# Patient Record
Sex: FEMALE | Race: WHITE
[De-identification: names, ages, dates, MRNs, and addresses within clinical notes are randomized per-mention and may not be internally consistent; named-entity substitution may affect disease eponyms.]

---

## 2017-02-03 ENCOUNTER — HOSPITAL ENCOUNTER (OUTPATIENT)
Dept: HOSPITAL 45 - C.LABPVFM | Age: 67
Discharge: HOME | End: 2017-02-03
Attending: NURSE PRACTITIONER
Payer: COMMERCIAL

## 2017-02-03 DIAGNOSIS — I10: Primary | ICD-10-CM

## 2017-02-03 DIAGNOSIS — E78.1: ICD-10-CM

## 2017-02-03 DIAGNOSIS — E11.9: ICD-10-CM

## 2017-02-03 LAB
ANION GAP SERPL CALC-SCNC: 8 MMOL/L (ref 3–11)
BUN SERPL-MCNC: 11 MG/DL (ref 7–18)
BUN/CREAT SERPL: 13 (ref 10–20)
CALCIUM SERPL-MCNC: 9 MG/DL (ref 8.5–10.1)
CHLORIDE SERPL-SCNC: 100 MMOL/L (ref 98–107)
CHOLEST/HDLC SERPL: 4.2 {RATIO}
CO2 SERPL-SCNC: 35 MMOL/L (ref 21–32)
CREAT SERPL-MCNC: 0.82 MG/DL (ref 0.6–1.2)
EST. AVERAGE GLUCOSE BLD GHB EST-MCNC: 111 MG/DL
GLUCOSE SERPL-MCNC: 103 MG/DL (ref 70–99)
GLUCOSE UR QL: 54 MG/DL
KETONES UR QL STRIP: 102 MG/DL
NITRITE UR QL STRIP: 359 MG/DL (ref 0–150)
PH UR: 228 MG/DL (ref 0–200)
POTASSIUM SERPL-SCNC: 2.9 MMOL/L (ref 3.5–5.1)
SODIUM SERPL-SCNC: 143 MMOL/L (ref 136–145)
VERY LOW DENSITY LIPOPROT CALC: 72 MG/DL

## 2017-07-28 ENCOUNTER — HOSPITAL ENCOUNTER (OUTPATIENT)
Dept: HOSPITAL 45 - C.LABPVFM | Age: 67
Discharge: HOME | End: 2017-07-28
Attending: NURSE PRACTITIONER
Payer: COMMERCIAL

## 2017-07-28 DIAGNOSIS — E11.9: ICD-10-CM

## 2017-07-28 DIAGNOSIS — I10: Primary | ICD-10-CM

## 2017-07-28 DIAGNOSIS — E78.5: ICD-10-CM

## 2017-07-28 DIAGNOSIS — E87.6: ICD-10-CM

## 2017-07-28 DIAGNOSIS — N28.9: ICD-10-CM

## 2017-07-28 DIAGNOSIS — E78.1: ICD-10-CM

## 2017-07-28 LAB
ALBUMIN/GLOB SERPL: 1.1 {RATIO} (ref 0.9–2)
ALP SERPL-CCNC: 86 U/L (ref 45–117)
ALT SERPL-CCNC: 22 U/L (ref 12–78)
ANION GAP SERPL CALC-SCNC: 4 MMOL/L (ref 3–11)
AST SERPL-CCNC: 13 U/L (ref 15–37)
BUN SERPL-MCNC: 12 MG/DL (ref 7–18)
BUN/CREAT SERPL: 16.9 (ref 10–20)
CALCIUM SERPL-MCNC: 9.8 MG/DL (ref 8.5–10.1)
CHLORIDE SERPL-SCNC: 104 MMOL/L (ref 98–107)
CO2 SERPL-SCNC: 33 MMOL/L (ref 21–32)
CREAT SERPL-MCNC: 0.74 MG/DL (ref 0.6–1.2)
EST. AVERAGE GLUCOSE BLD GHB EST-MCNC: 108 MG/DL
GLOBULIN SER-MCNC: 3.6 GM/DL (ref 2.5–4)
GLUCOSE SERPL-MCNC: 97 MG/DL (ref 70–99)
POTASSIUM SERPL-SCNC: 2.9 MMOL/L (ref 3.5–5.1)
SODIUM SERPL-SCNC: 141 MMOL/L (ref 136–145)

## 2017-09-22 ENCOUNTER — HOSPITAL ENCOUNTER (INPATIENT)
Dept: HOSPITAL 45 - C.EDB | Age: 67
LOS: 5 days | Discharge: NURSING, REHAB OR LONG-TERM CARE | DRG: 64 | End: 2017-09-27
Attending: HOSPITALIST | Admitting: HOSPITALIST
Payer: COMMERCIAL

## 2017-09-22 VITALS
WEIGHT: 239.64 LBS | HEIGHT: 64 IN | BODY MASS INDEX: 40.91 KG/M2 | HEIGHT: 64 IN | WEIGHT: 239.64 LBS | BODY MASS INDEX: 40.91 KG/M2

## 2017-09-22 VITALS
TEMPERATURE: 98.06 F | HEART RATE: 74 BPM | OXYGEN SATURATION: 94 % | DIASTOLIC BLOOD PRESSURE: 88 MMHG | SYSTOLIC BLOOD PRESSURE: 173 MMHG

## 2017-09-22 VITALS — OXYGEN SATURATION: 94 %

## 2017-09-22 DIAGNOSIS — Z83.3: ICD-10-CM

## 2017-09-22 DIAGNOSIS — Z79.82: ICD-10-CM

## 2017-09-22 DIAGNOSIS — R73.03: ICD-10-CM

## 2017-09-22 DIAGNOSIS — R68.2: ICD-10-CM

## 2017-09-22 DIAGNOSIS — Z82.49: ICD-10-CM

## 2017-09-22 DIAGNOSIS — I10: ICD-10-CM

## 2017-09-22 DIAGNOSIS — J44.9: ICD-10-CM

## 2017-09-22 DIAGNOSIS — Z86.73: ICD-10-CM

## 2017-09-22 DIAGNOSIS — R53.1: ICD-10-CM

## 2017-09-22 DIAGNOSIS — R32: ICD-10-CM

## 2017-09-22 DIAGNOSIS — E78.5: ICD-10-CM

## 2017-09-22 DIAGNOSIS — E87.6: ICD-10-CM

## 2017-09-22 DIAGNOSIS — R47.81: ICD-10-CM

## 2017-09-22 DIAGNOSIS — I63.9: Primary | ICD-10-CM

## 2017-09-22 DIAGNOSIS — J18.9: ICD-10-CM

## 2017-09-22 DIAGNOSIS — Z80.1: ICD-10-CM

## 2017-09-22 DIAGNOSIS — F17.210: ICD-10-CM

## 2017-09-22 LAB
ALP SERPL-CCNC: 84 U/L (ref 45–117)
ALT SERPL-CCNC: 17 U/L (ref 12–78)
ANION GAP SERPL CALC-SCNC: 6 MMOL/L (ref 3–11)
APPEARANCE UR: CLEAR
AST SERPL-CCNC: 14 U/L (ref 15–37)
BASOPHILS # BLD: 0.04 K/UL (ref 0–0.2)
BASOPHILS NFR BLD: 0.5 %
BILIRUB UR-MCNC: (no result) MG/DL
BUN SERPL-MCNC: 13 MG/DL (ref 7–18)
BUN/CREAT SERPL: 16.7 (ref 10–20)
CALCIUM SERPL-MCNC: 9 MG/DL (ref 8.5–10.1)
CHLORIDE SERPL-SCNC: 106 MMOL/L (ref 98–107)
CO2 SERPL-SCNC: 29 MMOL/L (ref 21–32)
COLOR UR: YELLOW
COMPLETE: YES
CREAT CL PREDICTED SERPL C-G-VRATE: 88.2 ML/MIN
CREAT SERPL-MCNC: 0.76 MG/DL (ref 0.6–1.2)
EOSINOPHIL NFR BLD AUTO: 221 K/UL (ref 130–400)
GLUCOSE SERPL-MCNC: 94 MG/DL (ref 70–99)
HCT VFR BLD CALC: 43.6 % (ref 37–47)
IG%: 0.3 %
IMM GRANULOCYTES NFR BLD AUTO: 25.2 %
INR PPP: 0.9 (ref 0.9–1.1)
LYMPHOCYTES # BLD: 1.99 K/UL (ref 1.2–3.4)
MAGNESIUM SERPL-MCNC: 2.2 MG/DL (ref 1.8–2.4)
MANUAL MICROSCOPIC REQUIRED?: NO
MCH RBC QN AUTO: 30 PG (ref 25–34)
MCHC RBC AUTO-ENTMCNC: 32.8 G/DL (ref 32–36)
MCV RBC AUTO: 91.6 FL (ref 80–100)
MONOCYTES NFR BLD: 8.4 %
NEUTROPHILS # BLD AUTO: 1.4 %
NEUTROPHILS NFR BLD AUTO: 64.2 %
NITRITE UR QL STRIP: (no result)
PARTIAL THROMBOPLASTIN RATIO: 1.2
PH UR STRIP: 7.5 [PH] (ref 4.5–7.5)
PMV BLD AUTO: 10.8 FL (ref 7.4–10.4)
POTASSIUM SERPL-SCNC: 3.2 MMOL/L (ref 3.5–5.1)
PROTHROMBIN TIME: 10.1 SECONDS (ref 9–12)
RBC # BLD AUTO: 4.76 M/UL (ref 4.2–5.4)
REVIEW REQ?: NO
SODIUM SERPL-SCNC: 141 MMOL/L (ref 136–145)
SP GR UR STRIP: 1.01 (ref 1–1.03)
TSH SERPL-ACNC: 3.5 UIU/ML (ref 0.3–4.5)
URINE BILL WITH OR WITHOUT MIC: (no result)
UROBILINOGEN UR-MCNC: (no result) MG/DL
WBC # BLD AUTO: 7.89 K/UL (ref 4.8–10.8)

## 2017-09-22 RX ADMIN — Medication STA MG: at 22:32

## 2017-09-22 RX ADMIN — Medication STA MG: at 22:39

## 2017-09-22 RX ADMIN — FUROSEMIDE SCH MG: 40 TABLET ORAL at 22:39

## 2017-09-22 RX ADMIN — METOPROLOL TARTRATE STA MG: 5 INJECTION, SOLUTION INTRAVENOUS at 19:16

## 2017-09-22 RX ADMIN — HEPARIN SODIUM SCH UNIT: 10000 INJECTION, SOLUTION INTRAVENOUS; SUBCUTANEOUS at 22:37

## 2017-09-22 RX ADMIN — METOPROLOL TARTRATE STA MG: 5 INJECTION, SOLUTION INTRAVENOUS at 19:21

## 2017-09-22 RX ADMIN — VERAPAMIL HYDROCHLORIDE SCH MG: 240 TABLET, FILM COATED, EXTENDED RELEASE ORAL at 22:39

## 2017-09-22 RX ADMIN — CEFTRIAXONE SODIUM SCH MLS/HR: 1 INJECTION, POWDER, FOR SOLUTION INTRAVENOUS at 23:53

## 2017-09-22 NOTE — EMERGENCY ROOM VISIT NOTE
History


Report prepared by Kristie:  Jaquan Peña


Under the Supervision of:  Dr. Andre Rich D.O.


First contact with patient:  16:29


Chief Complaint:  STROKE SYMPTOMS


Stated Complaint:  NUMBNESS,SLURRED SPEECH


Nursing Triage Summary:  


Pt verbalizes on Tuesday she had laser surgery on her left eye. Right arm/right 


leg went numb on tuesday after procedure, thought it was a pinched nerve from 


chronic neck pain, did not see doctor at that time. Since then symptoms have 


worsened, speech is now slurred and right sided numbness remains and now feels 


weak on right side.





History of Present Illness


The patient is a 66 year old female with a history of COPD who presents to the 

Emergency Room with complaints of persistent stroke symptoms that started 3 

days ago. She says that she had laser surgery on her left eye 3 days ago, and 

after the procedure, her right arm and right leg went numb. The patient states 

that she thought it was a pinched nerve, but since then, she had weakness in 

her right arm and right leg. She says that nothing is numb currently, but the 

weakness has persisted. She adds that she has tongue swelling. She notes that 

she was seen at Dr. Wharton's office prior to arrival, and was sent here due 

to her symptoms. The patient adds that she was completely fine 3 mornings ago 

before the procedure.  She notes that she had been taking Spirolactone for a 

couple years, but was taken off of it 2 and a half years ago, but was put back 

on the Spirolactone a week and a half ago by Dr. Euceda. The patient denies any 

headaches, nausea, vomiting, or worsened shortness of breath. She notes no 

history of strokes. She smokes a half a pack of cigarettes per day.





   Source of History:  patient


   Onset:  3 days ago


   Position:  other (global - stroke symptoms)


   Timing:  other (persistent)


   Associated Symptoms:  + weakness (right-sided), + numbness (right-sided but 

no numbness anymore), No headache, No SOB (any worsened), No nausea, No vomiting


Note:


No other associated symptoms noted.





Review of Systems


See HPI for pertinent positives & negatives. A total of 10 systems reviewed and 

were otherwise negative.





Past Medical & Surgical


Medical Problems:


(1) COPD (chronic obstructive pulmonary disease)


(2) HTN (hypertension)


(3) Neck pain


(4) Stroke








Family History





Cancer


Diabetes mellitus





Social History


Smoking Status:  Current Every Day Smoker


Drug Use:  none


Marital Status:  


Occupation Status:  employed





Current/Historical Medications


Scheduled


Acetaminophen (Tylenol), 2 TAB PO QID


Albuterol Hfa (Ventolin Hfa), 2 PUFFS INH DAILY


Allopurinol (Allopurinol), 300 MG PO QAM


Aspirin (Aspir-81), 81 MG PO HS


Diphenhydramine Hcl (Allergy), 1 CAP PO BID


Furosemide (Lasix), 40 MG PO BID


Multivitamin (Multivitamin), 1 TAB PO QAM


Nicotine (Nicoderm Cq 21MG Patch), 1 PATCH TD DAILY


Potassium Chloride (Potassium Chloride Er), 2 TAB PO BID


Pseudoephedrine (Sudafed), 30 MG PO QID


Spironolactone (Spironolactone), 1 TAB PO DAILY


Tolterodine Tartrate (Detrol LA), Unknown Dose PO DAILY


Verapamil Hcl (Calan Sr Ext Rel), 240 MG PO BID





Scheduled PRN


Silver Sulfadiazine (Silvadene), 1 APPLN TOP DAILY PRN for PRN





Allergies


Coded Allergies:  


     Grass (Verified  Allergy, Intermediate, nasal conjestion,watery eyes, 9/22/ 17)


     Ibuprofen (Verified  Allergy, Intermediate, hives, post nasal drip, TAKES 

ASA AT HOME, 9/22/17)


     Indomethacin (Verified  Allergy, Intermediate, hives, TAKES ASA AT HOME, 9/ 22/17)


     NSAIDs (Verified  Allergy, Intermediate, hives, TAKES ASA AT HOME, 9/22/17)


     Naproxen (Verified  Allergy, Intermediate, hives, 9/22/17)


     Ragweed (Verified  Allergy, Intermediate, nasal conjestion, watery eyes, 9/ 22/17)


     Ampicillin (Unverified  Allergy, Mild, N/V AND HIVES, 9/22/17)


     Salicylates (Unverified  Allergy, Mild, DIARRHEA, RASH, 9/22/17)


 LOW-DOSE ASPIRIN OKAY


     Statins (Verified  Allergy, Unknown, MUSCLE ACHES, 9/22/17)


Uncoded Allergies:  


     acryllic (Allergy, Intermediate, nasal conjestion, wheezing, 10/29/14)





Physical Exam


Vital Signs











  Date Time  Temp Pulse Resp B/P (MAP) Pulse Ox O2 Delivery O2 Flow Rate FiO2


 


9/22/17 19:27  71 25 152/78 94 Nasal Cannula 2.0 


 


9/22/17 19:21  75  192/113    


 


9/22/17 19:04  79 23 200/112 90 Room Air  


 


9/22/17 17:02      Room Air  


 


9/22/17 17:01      Room Air  


 


9/22/17 16:45  93      


 


9/22/17 16:20 36.8 96 22 199/89 93 Room Air  











Physical Exam


GENERAL:  Patient is awake, alert, non-anxious appearing and comfortable.


EYES: Left pupil was dilated and nonreactive to light. Right pupil was mid-

sized and reactive to light. Extraocular movements were intact.


EARS, NOSE, MOUTH AND THROAT: The nose is without any evidence of any 

deformity. Mucous membranes are moist tongue is midline 


NECK: The neck is nontender and supple.


RESPIRATORY: Normal respiratory effort is noted there is no evidence of 

wheezing rhonchi or rales


CARDIOVASCULAR:  Regular rate and rhythm noted there no murmurs rubs or gallops 

normal S1 normal S2 


GASTROINTESTINAL: The abdomen is soft. Bowel sounds are present in all 

quadrants. Abdomen is nontender


MUSCULOSKELETAL/EXTREMITIES: There is no evidence of gross deformity full range 

of motion is noted in the hips and shoulders


SKIN: Pedal edema bilaterally.


NEUROLOGIC:  Patient is awake alert and oriented x3. Speech was slurred but 

understandable.  strength was symmetric.





Medical Decision & Procedures


ER Provider


Diagnostic Interpretation:


Radiology results as stated below per my review and radiologist interpretation:








HEAD WITHOUT CONTRAST (CT)





CLINICAL HISTORY: 66 years-old Female presenting with EVALUATE ALTERED MENTAL


STATUS/WEAKNESS, slurred speech. 





TECHNIQUE: Multidetector CT imaging of the head was performed without the use of


intravenous contrast. IV contrast: None. A dose lowering technique was used


consistent with the principles of ALARA (as low as reasonably achievable). 





COMPARISON:  None.





CT DOSE (mGy.cm): The estimated cumulative dose is 1535.66 mGy.cm.





FINDINGS: 





 topogram: Unremarkable.





Posterior fossa cyst, likely arachnoid cyst. Ventricles and sulci normal in


size. Old lacunar infarct noted in the right basal ganglia. No mass effect or


midline shift. No hemorrhage or acute territorial infarct. No extra-axial fluid


collection. Paranasal sinuses and mastoid air cells clear. Calvarium intact.    





IMPRESSION:


1.  No acute intracranial pathology. 





2.  Old lacunar infarct in the right basal ganglia.





Electronically signed by:  Florentino Davidson M.D.


9/22/2017 5:27 PM





Dictated Date/Time:  9/22/2017 5:24 PM














CHEST ONE VIEW PORTABLE





CLINICAL HISTORY: EVALUATE ALTERED MENTAL STATUS/WEAKNESS dyspnea





COMPARISON STUDY:  No previous studies for comparison.





FINDINGS: Mild cardiomegaly. Post 5





Infiltrate right base. Pulmonary vascular congestion. Diaphragms are smooth. 





IMPRESSION:  Pulmonary vascular congestion. Mild superimposed infiltrate right


base. 








The above report was generated using voice recognition software.  It may contain


grammatical, syntax or spelling errors.








Electronically signed by:  Chencho Butterfield M.D.


9/22/2017 5:18 PM





Dictated Date/Time:  9/22/2017 5:18 PM





Laboratory Results


9/22/17 16:50








Red Blood Count 4.76, Mean Corpuscular Volume 91.6, Mean Corpuscular Hemoglobin 

30.0, Mean Corpuscular Hemoglobin Concent 32.8, Mean Platelet Volume 10.8, 

Neutrophils (%) (Auto) 64.2, Lymphocytes (%) (Auto) 25.2, Monocytes (%) (Auto) 

8.4, Eosinophils (%) (Auto) 1.4, Basophils (%) (Auto) 0.5, Neutrophils # (Auto) 

5.07, Lymphocytes # (Auto) 1.99, Monocytes # (Auto) 0.66, Eosinophils # (Auto) 

0.11, Basophils # (Auto) 0.04





9/22/17 16:50

















Test


  9/22/17


16:50 9/22/17


18:25


 


White Blood Count


  7.89 K/uL


(4.8-10.8) 


 


 


Red Blood Count


  4.76 M/uL


(4.2-5.4) 


 


 


Hemoglobin


  14.3 g/dL


(12.0-16.0) 


 


 


Hematocrit 43.6 % (37-47)  


 


Mean Corpuscular Volume


  91.6 fL


() 


 


 


Mean Corpuscular Hemoglobin


  30.0 pg


(25-34) 


 


 


Mean Corpuscular Hemoglobin


Concent 32.8 g/dl


(32-36) 


 


 


Platelet Count


  221 K/uL


(130-400) 


 


 


Mean Platelet Volume


  10.8 fL


(7.4-10.4) 


 


 


Neutrophils (%) (Auto) 64.2 %  


 


Lymphocytes (%) (Auto) 25.2 %  


 


Monocytes (%) (Auto) 8.4 %  


 


Eosinophils (%) (Auto) 1.4 %  


 


Basophils (%) (Auto) 0.5 %  


 


Neutrophils # (Auto)


  5.07 K/uL


(1.4-6.5) 


 


 


Lymphocytes # (Auto)


  1.99 K/uL


(1.2-3.4) 


 


 


Monocytes # (Auto)


  0.66 K/uL


(0.11-0.59) 


 


 


Eosinophils # (Auto)


  0.11 K/uL


(0-0.5) 


 


 


Basophils # (Auto)


  0.04 K/uL


(0-0.2) 


 


 


RDW Standard Deviation


  47.5 fL


(36.4-46.3) 


 


 


RDW Coefficient of Variation


  14.2 %


(11.5-14.5) 


 


 


Immature Granulocyte % (Auto) 0.3 %  


 


Immature Granulocyte # (Auto)


  0.02 K/uL


(0.00-0.02) 


 


 


Prothrombin Time


  10.1 SECONDS


(9.0-12.0) 


 


 


Prothromb Time International


Ratio 0.9 (0.9-1.1) 


  


 


 


Activated Partial


Thromboplast Time 31.4 SECONDS


(21.0-31.0) 


 


 


Partial Thromboplastin Ratio 1.2  


 


Anion Gap


  6.0 mmol/L


(3-11) 


 


 


Est Creatinine Clear Calc


Drug Dose 88.2 ml/min 


  


 


 


Estimated GFR (


American) 93.4 


  


 


 


Estimated GFR (Non-


American 80.6 


  


 


 


BUN/Creatinine Ratio 16.7 (10-20)  


 


Calcium Level


  9.0 mg/dl


(8.5-10.1) 


 


 


Magnesium Level


  2.2 mg/dl


(1.8-2.4) 


 


 


Total Bilirubin


  0.5 mg/dl


(0.2-1) 


 


 


Direct Bilirubin


  0.1 mg/dl


(0-0.2) 


 


 


Aspartate Amino Transf


(AST/SGOT) 14 U/L (15-37) 


  


 


 


Alanine Aminotransferase


(ALT/SGPT) 17 U/L (12-78) 


  


 


 


Alkaline Phosphatase


  84 U/L


() 


 


 


Troponin I


  < 0.015 ng/ml


(0-0.045) 


 


 


Total Protein


  7.1 gm/dl


(6.4-8.2) 


 


 


Albumin


  3.7 gm/dl


(3.4-5.0) 


 


 


Thyroid Stimulating Hormone


(TSH) 3.500 uIu/ml


(0.300-4.500) 


 


 


Urine Color  YELLOW 


 


Urine Appearance  CLEAR (CLEAR) 


 


Urine pH  7.5 (4.5-7.5) 


 


Urine Specific Gravity


  


  1.013


(1.000-1.030)


 


Urine Protein  NEG (NEG) 


 


Urine Glucose (UA)  NEG (NEG) 


 


Urine Ketones  NEG (NEG) 


 


Urine Occult Blood  NEG (NEG) 


 


Urine Nitrite  NEG (NEG) 


 


Urine Bilirubin  NEG (NEG) 


 


Urine Urobilinogen  NEG (NEG) 


 


Urine Leukocyte Esterase  NEG (NEG) 





Laboratory results per my review.





Medications Administered











 Medications


  (Trade)  Dose


 Ordered  Sig/Shiv


 Route  Start Time


 Stop Time Status Last Admin


Dose Admin


 


 Levofloxacin


  (Levaquin / D5W)  750 mg  NOW  STAT


 IV  9/22/17 19:08


 9/22/17 19:09 DC 9/22/17 19:15


750 MG


 


 Metoprolol


 Tartrate


  (Lopressor Iv)  15 mg  NOW  STAT


 IV  9/22/17 19:09


 9/22/17 19:10 DC 9/22/17 19:21


15 MG











ECG


Indication:  weakness


Rate (beats per minute):  88


Rhythm:  normal sinus


Findings:  no ectopy, other (no acute ST segment abnormalities)


Comparison ECG Date:  no prior available





ED Course


1631: The patient was evaluated in room A10. A complete history and physical 

examination were performed. 





1906: I reevaluated the patient and she is resting comfortably. The patient 

verbally expressed understanding and agreement with the treatment plan. The 

patient will be evaluated for further treatment.





1908: Ordered Levaquin / D5W 750 mg IV.





1909: Ordered Lopressor IV 15 mg IV.





1930: I discussed the patient with Dr. Rinaldi - [facility] internist. He 

will evaluate the patient for further treatment.





Medical Decision





Differential diagnosis:


Etiologies such as metabolic, infection, hypo/hyperglycemia, electrolyte 

abnormalities, cardiac sources, intracerebral event, toxicologic, neurologic, 

as well as others were entertained. 





Nursing notes reviewed.





The patient is a 66-year-old female who presented to the emergency department 

for ongoing right-sided weakness. The patient noticed symptoms a few days ago. 

She also has a cough. Her chest x-ray appears to be consistent with pneumonia. 

The patient does not have an acute CVA on CT the head however she does have a 

previous lacunar infarct. It does not appear to be in a distribution that would 

explain today's symptoms however given the presence of a previous CVA I do feel 

the patient could be at risk and may require further testing. I discussed the 

patient's laboratory and radiographic studies with her. She was treated with 

medication for blood pressure and also given an IV antibiotic in the emergency 

department. I discussed his case with the on-call [facility] hospitalist 

group. They've agreed to evaluate the patient in the emergency department for 

further management and disposition.





Head Trauma


GCS Score:  15





Medication Reconcilliation


Current Medication List:  was personally reviewed by me





Blood Pressure Screening


Patient's blood pressure:  Elevated blood pressure


Referred to hospitalist.





Consults


Time Called:  1920


Consulting Physician[facility]  [facility]


Returned Call:  1930 (in person)


I discussed the patient with Dr. Rinaldi - [facility] internist. He will 

evaluate the patient for further treatment.





Impression





 Primary Impression:  


 Right sided weakness


 Additional Impressions:  


 PNA (pneumonia)


 HTN (hypertension)





Scribe Attestation


The scribe's documentation has been prepared under my direction and personally 

reviewed by me in its entirety. I confirm that the note above accurately 

reflects all work, treatment, procedures, and medical decision making performed 

by me.





Departure Information


Dispostion


Being Evaluated By Hospitalist





Referrals


Florentino Askew M.D. (PCP)





Patient Instructions


My [facility]





Problem Qualifiers








 Additional Impressions:  


 PNA (pneumonia)


 Pneumonia type:  due to unspecified organism  Laterality:  unspecified 

laterality  Lung location:  unspecified part of lung  Qualified Codes:  J18.9 - 

Pneumonia, unspecified organism


 HTN (hypertension)


 Hypertension type:  unspecified  Qualified Codes:  I10 - Essential (primary) 

hypertension

## 2017-09-22 NOTE — DIAGNOSTIC IMAGING REPORT
CHEST ONE VIEW PORTABLE



CLINICAL HISTORY: EVALUATE ALTERED MENTAL STATUS/WEAKNESS dyspnea



COMPARISON STUDY:  No previous studies for comparison.



FINDINGS: Mild cardiomegaly. Post 5



Infiltrate right base. Pulmonary vascular congestion. Diaphragms are smooth. 



IMPRESSION:  Pulmonary vascular congestion. Mild superimposed infiltrate right

base. 











The above report was generated using voice recognition software.  It may contain

grammatical, syntax or spelling errors.









Electronically signed by:  Chencho Butterfield M.D.

9/22/2017 5:18 PM



Dictated Date/Time:  9/22/2017 5:18 PM

## 2017-09-22 NOTE — HISTORY AND PHYSICAL
History & Physical


Date & Time of Service:


Sep 22, 2017 at 19:21


Chief Complaint:


Numbness,Slurred Speech


Primary Care Physician:


Florentino Askew M.D.


History of Present Illness


Source:  patient, family, clinic records, hospital records


66F with a PMHx of COPD (half pk per day x 50 years on Albuterol), HTN, 

cervical radiculopathy and poor ambulation at baseline p/w right arm tingling 

and weakness since Tuesday.  She had laser eye surgery with Dr. Perla on 

Tuesday and thought that her right arm weakness and tingling was part of her 

chronic cervical radiculopathy.  Pt states that her right leg is also weaker 

than her left leg but that weakness predates the eye surgery with Dr. Perla.  

She was sent to the hospital today from Dr. Wharton's office who wants to 

rule out a stroke.  According to pt and her  she hasn't had any changes 

in her gait.  Her  also hasn't noticed any changes with his wife which 

include changes in her gait or facial asymmetry.  On ROS pt states that she has 

been having slurred speech but she  thinks that is from her dry mouth.  She 

denies any symptoms of dysphagia.





On the ED, chest X-ray revealed a right lower lobe infiltrate and pt was 

started on Levofloxacin.  Pt denies any coughing or fevers however while in the 

exam room she did coughed one time.   





ROS: Pt does not feel sick, she in fact feels at her baseline except for her 

right arm weakness, no vomiting, no choking, no dysphagia, no cough, no fevers, 

no chest pain, no visual changes, denies changes in her gait.  


  


SHx:   lives with , retired, uses a motorized scooter to ambulate 

at baseline.





Past Medical/Surgical History


Medical Problems:


(1) COPD (chronic obstructive pulmonary disease)


Status: Chronic  





(2) HTN (hypertension)


Status: Chronic  





(3) Neck pain


Status: Chronic  











Family History





Cancer


Diabetes mellitus





Social History


Smoking Status:  Current Every Day Smoker


Smokeless Tobacco Use:  No


Alcohol Use:  none


Drug Use:  none


Marital Status:  


Housing status:  lives with family


Occupational Status:  employed





Immunizations


History of Influenza Vaccine:  Unknown


History of Tetanus Vaccine?:  Unknown


History of Pneumococcal:  Unknown


History of Hepatitis B Vaccine:  Unknown





Multi-Drug Resistant Organisms


History of MDRO:  No





Allergies


Coded Allergies:  


     Grass (Verified  Allergy, Intermediate, nasal conjestion,watery eyes, 9/22/ 17)


     Ibuprofen (Verified  Allergy, Intermediate, hives, post nasal drip, TAKES 

ASA AT HOME, 9/22/17)


     Indomethacin (Verified  Allergy, Intermediate, hives, TAKES ASA AT HOME, 9/ 22/17)


     NSAIDs (Verified  Allergy, Intermediate, hives, TAKES ASA AT HOME, 9/22/17)


     Naproxen (Verified  Allergy, Intermediate, hives, 9/22/17)


     Ragweed (Verified  Allergy, Intermediate, nasal conjestion, watery eyes, 9/ 22/17)


     Ampicillin (Unverified  Allergy, Mild, N/V AND HIVES, 9/22/17)


     Salicylates (Unverified  Allergy, Mild, DIARRHEA, RASH, 9/22/17)


 LOW-DOSE ASPIRIN OKAY


     Statins (Verified  Allergy, Unknown, MUSCLE ACHES, 9/22/17)


Uncoded Allergies:  


     acryllic (Allergy, Intermediate, nasal conjestion, wheezing, 10/29/14)





Home Medications


Scheduled


Acetaminophen (Tylenol), 2 TAB PO QID


Albuterol Hfa (Ventolin Hfa), 2 PUFFS INH DAILY


Allopurinol (Allopurinol), 300 MG PO QAM


Aspirin (Aspir-81), 81 MG PO HS


Diphenhydramine Hcl (Allergy), 1 CAP PO BID


Furosemide (Lasix), 40 MG PO BID


Multivitamin (Multivitamin), 1 TAB PO QAM


Nicotine (Nicoderm Cq 21MG Patch), 1 PATCH TD DAILY


Potassium Chloride (Potassium Chloride Er), 2 TAB PO BID


Pseudoephedrine (Sudafed), 30 MG PO QID


Spironolactone (Spironolactone), 1 TAB PO DAILY


Tolterodine Tartrate (Detrol LA), Unknown Dose PO DAILY


Verapamil Hcl (Calan Sr Ext Rel), 240 MG PO BID





Scheduled PRN


Silver Sulfadiazine (Silvadene), 1 APPLN TOP DAILY PRN for PRN





Review of Systems


Constitutional:  No fever, No chills


ENT:  No sore throat


Respiratory:  + cough, No shortness of breath


Cardiovascular:  No chest pain


Abdomen:  No nausea, No vomiting, No diarrhea


Musculoskeletal:  + joint pain, + muscle pain, No swelling, No calf pain


Genitourinary - Female:  No dysuria


Psychiatric:  No depression symptoms


Endocrine:  No fatigue


Integumentary:  No rash





Physical Exam


Vital Signs











  Date Time  Temp Pulse Resp B/P (MAP) Pulse Ox O2 Delivery O2 Flow Rate FiO2


 


9/22/17 19:16  80  200/112    


 


9/22/17 19:04  79 23 200/112  Room Air  


 


9/22/17 17:02      Room Air  


 


9/22/17 17:01      Room Air  


 


9/22/17 16:45  93      


 


9/22/17 16:20 36.8 96 22 199/89 93 Room Air  








General Appearance:  WD/WN, + obese


Head:  normocephalic, atraumatic


Eyes:  + pertinent finding (right pupil was constricted compared to the left 

pupil)


Neck:  supple, no JVD


Respiratory/Chest:  chest non-tender, lungs clear, normal breath sounds, no 

respiratory distress, no accessory muscle use


Cardiovascular:  regular rate, rhythm, no murmur


Abdomen/GI:  normal bowel sounds, non tender, soft, + pertinent finding (obese)


Back:  normal inspection, no CVA tenderness, no muscle spasm


Extremities/Musculoskelatal:  no calf tenderness, non-tender, + pertinent 

finding (Pt was unable to raise the right leg past 30 degrees and unable to 

raise the left leg past 45 degrees.  She had significantly decreased hand 

strength on the right side (handshake, finger squeeze) vs the left.  She was 

also unable to raise her right arm to the same height as her left arm.  )


Neurologic/Psych:  CNs II-XII nml as tested, no motor/sensory deficits, alert, 

normal mood/affect, oriented x 3, + pertinent finding (no facial droop, facial 

muscles were symmetric, sensation was intact and symmetrical to light touch 

over the lower extremities, upper extremities and face bilaterally.  Shoulde 

shrug was intact, frown was symmetrical, smile was symmetrical, tongue 

protrusion was symmetrical, uvula was symmetrical.  Patient did not seem to 

have slurred speech for me.  )


Skin:  no rash





Diagnostics


Laboratory Results





Results Past 24 Hours








Test


  9/22/17


16:50 9/22/17


18:25 Range/Units


 


 


White Blood Count 7.89  4.8-10.8  K/uL


 


Red Blood Count 4.76  4.2-5.4  M/uL


 


Hemoglobin 14.3  12.0-16.0  g/dL


 


Hematocrit 43.6  37-47  %


 


Mean Corpuscular Volume 91.6    fL


 


Mean Corpuscular Hemoglobin 30.0  25-34  pg


 


Mean Corpuscular Hemoglobin


Concent 32.8


  


  32-36  g/dl


 


 


Platelet Count 221  130-400  K/uL


 


Mean Platelet Volume 10.8  7.4-10.4  fL


 


Neutrophils (%) (Auto) 64.2   %


 


Lymphocytes (%) (Auto) 25.2   %


 


Monocytes (%) (Auto) 8.4   %


 


Eosinophils (%) (Auto) 1.4   %


 


Basophils (%) (Auto) 0.5   %


 


Neutrophils # (Auto) 5.07  1.4-6.5  K/uL


 


Lymphocytes # (Auto) 1.99  1.2-3.4  K/uL


 


Monocytes # (Auto) 0.66  0.11-0.59  K/uL


 


Eosinophils # (Auto) 0.11  0-0.5  K/uL


 


Basophils # (Auto) 0.04  0-0.2  K/uL


 


RDW Standard Deviation 47.5  36.4-46.3  fL


 


RDW Coefficient of Variation 14.2  11.5-14.5  %


 


Immature Granulocyte % (Auto) 0.3   %


 


Immature Granulocyte # (Auto) 0.02  0.00-0.02  K/uL


 


Prothrombin Time


  10.1


  


  9.0-12.0


SECONDS


 


Prothromb Time International


Ratio 0.9


  


  0.9-1.1  


 


 


Activated Partial


Thromboplast Time 31.4


  


  21.0-31.0


SECONDS


 


Partial Thromboplastin Ratio 1.2   


 


Sodium Level 141  136-145  mmol/L


 


Potassium Level 3.2  3.5-5.1  mmol/L


 


Chloride Level 106    mmol/L


 


Carbon Dioxide Level 29  21-32  mmol/L


 


Anion Gap 6.0  3-11  mmol/L


 


Blood Urea Nitrogen 13  7-18  mg/dl


 


Creatinine


  0.76


  


  0.60-1.20


mg/dl


 


Est Creatinine Clear Calc


Drug Dose 88.2


  


   ml/min


 


 


Estimated GFR (


American) 93.4


  


   


 


 


Estimated GFR (Non-


American 80.6


  


   


 


 


BUN/Creatinine Ratio 16.7  10-20  


 


Random Glucose 94  70-99  mg/dl


 


Calcium Level 9.0  8.5-10.1  mg/dl


 


Magnesium Level 2.2  1.8-2.4  mg/dl


 


Total Bilirubin 0.5  0.2-1  mg/dl


 


Direct Bilirubin 0.1  0-0.2  mg/dl


 


Aspartate Amino Transf


(AST/SGOT) 14


  


  15-37  U/L


 


 


Alanine Aminotransferase


(ALT/SGPT) 17


  


  12-78  U/L


 


 


Alkaline Phosphatase 84    U/L


 


Troponin I < 0.015  0-0.045  ng/ml


 


Total Protein 7.1  6.4-8.2  gm/dl


 


Albumin 3.7  3.4-5.0  gm/dl


 


Thyroid Stimulating Hormone


(TSH) 3.500


  


  0.300-4.500


uIu/ml


 


Urine Color  YELLOW  


 


Urine Appearance  CLEAR CLEAR  


 


Urine pH  7.5 4.5-7.5  


 


Urine Specific Gravity  1.013 1.000-1.030  


 


Urine Protein  NEG NEG  


 


Urine Glucose (UA)  NEG NEG  


 


Urine Ketones  NEG NEG  


 


Urine Occult Blood  NEG NEG  


 


Urine Nitrite  NEG NEG  


 


Urine Bilirubin  NEG NEG  


 


Urine Urobilinogen  NEG NEG  


 


Urine Leukocyte Esterase  NEG NEG  











Diagnostic Radiology


CHEST ONE VIEW PORTABLE





CLINICAL HISTORY: EVALUATE ALTERED MENTAL STATUS/WEAKNESS dyspnea





COMPARISON STUDY:  No previous studies for comparison.





FINDINGS: Mild cardiomegaly. Post 5





Infiltrate right base. Pulmonary vascular congestion. Diaphragms are smooth. 





IMPRESSION:  Pulmonary vascular congestion. Mild superimposed infiltrate right


base. 








**********************************************  








[~ rep ct add3]]


HEAD WITHOUT CONTRAST (CT)





CLINICAL HISTORY: 66 years-old Female presenting with EVALUATE ALTERED MENTAL


STATUS/WEAKNESS, slurred speech. 





TECHNIQUE: Multidetector CT imaging of the head was performed without the use of


intravenous contrast. IV contrast: None. A dose lowering technique was used


consistent with the principles of ALARA (as low as reasonably achievable). 





COMPARISON:  None.





CT DOSE (mGy.cm): The estimated cumulative dose is 1535.66 mGy.cm.





FINDINGS: 





 topogram: Unremarkable.





Posterior fossa cyst, likely arachnoid cyst. Ventricles and sulci normal in


size. Old lacunar infarct noted in the right basal ganglia. No mass effect or


midline shift. No hemorrhage or acute territorial infarct. No extra-axial fluid


collection. Paranasal sinuses and mastoid air cells clear. Calvarium intact.    





IMPRESSION:


1.  No acute intracranial pathology. 





2.  Old lacunar infarct in the right basal ganglia.





Impression


Assessment and Plan


66F with a PMHx of COPD (half pk per day x 50 years on Albuterol), HTN, 

cervical radiculopathy and poor ambulation at baseline p/w right arm tingling 

and weakness since Tuesday.  There was an old right sided basal ganglia infarct 

on CT Scan.  On exam there are significant neurological deficits in the RUE vs 

the left and assymetrical pupils.  She was not a TPA candidate due to duration 

of symptoms.  She will be admitted to tele and worked up with a MRA head and 

neck and MRI brain.  She will also get an echocardiogram.  Per quality measures 

neurology was also consulted. 





Stroke:


- Right sided arm weakness since Tuesday + assymetrical pupils.  


- CT : Old R basal ganglia stroke.


- MRA Head and Neck (combo) pending- PT refused


- MRI brain pending. -  PT refused


- In leiu of MRA and MRI, we are getting a CTA head and neck.  


- Echo pending.


- Neurology consulted (as per protocol)


- Speech therapy consulted. 


- Anticoagulation - ASA 81MG (pt takes this at home despite the documented 

NSAID allergy)


- Statin - Pt gets hives and rash so we did not order one. 





CV - 


   EKG showed normal sinus rhythm.


   Will put pt on tele. 


   Echo pending as above. 





HTN:  Continue home meds Verapamil 240mg XR, Lasix 40mg dialy and 

Spironolactone 25mg daily. 


  - Continue to monitor, pt was in the 180s systolic in the ED and given 5mg 

Metoprolol. 





Pneumonia Right Base - ?Aspiration vs Infectious


   X-ray showed pulmonary vascular congestion. Mild superimposed infiltrate 

right  base. 


   Pt is reporting a dry mouth, if there's a dysphagia, this may be a 

contributing factor. 


   Pt failed dysphagia screen in the ED, then got a moisturized swab of the 

mouth and passed her dysphagia screen on the floor. 


   WBC within normal limits. 


   Abx: changed Levoquin to Ceftriaxone 1g daily + Clindamycin 600mg IV Q6


   WBC normal.





COPD  - Albuterol as per home dosing.


   - Pt smokes half a pack of cigarettes per day.


   - Smoking cessation consult. 


   - Nicotine 21mg TD patch. 





Renal/ - 


   Pt is on Toleterodine at home for urinary incontinence, will continue as 

inpatient at 2mg daily PO. 


   Creatinine is WNL.


   Will monitor urine output.


   Lasix 40mg PO per home meds, 


    20mg KCL daily PO





GI/Diet - Pt denies any dysphagia or inability to take pills, ergo will start 

her on a heart healthy diet, this may need to be changed pending speech 

evaluation.





Endo - 


   Per patient she is a prediabetic, not on any meds, measures BS at home and 

gets  range.


   HBA1C pending. 





Heme  - 


   Hgb , INR, Platelets WNL.





ID - 


   Afebrile, WBC count normal.  


   Treating pneumonia as above. 





MSK - 


   Uses a motorized scooter to walk around at baseline.


   PT and OT on board.





Social - No concerns.





DVT Proph: Hep SQ TID





Dispo - Tele, retired,  lives with .





Full Code








Attending Addendum:








I have physically seen and examined this patient, have directed the resident's 

medical activities, and agree with the H&P as noted above with the following 

exceptions as noted.





The patient is awake,  alert and oriented 3, well-developed and well-nourished

, normocephalic and atraumatic, lying in bed and in no acute distress.





HEENT--physiologic anisocoria, mucous membranes  and oropharynx very dry.


Neck--supple, no JVD or bruits, thyroid normal, trachea midline, no adenopathy.


Heart--normal S1 and S2, no extra beats, no murmurs, rubs or gallops.


Lungs--clear bilaterally with good air movement, no respiratory distress, no 

accessory muscle use.


Abdomen--normal bowel sounds and soft, nontender and nondistended, no hernias 

or masses,  no organomegaly, obese


Extremities--no cyanosis, clubbing or edema. There are good distal pulses b/l.


Dermatologic--normal skin turgor, normal color, warm and dry, no abnormal lymph 

nodes, no rash.


Neurologic--cranial nerves II through XII grossly intact, decreased motor RU 

and RL as described. LLE decreased due to deconditioning


Rheumatologic--limited by above.


Psychiatric--normal affect.








Assessment and Plan:





CVA/right sided weakness/previously reported dysphagia secondary to severely 

dry mouth--


The patient will be admitted to telemetry for serial cardiac enzymes, cardiac 

rhythm monitoring and a 2-D echocardiogram with Dopplers.


Patient unable to tolerate MRI.


Order CTA of head and neck.


Discontinue aspirin 81 mg by mouth daily, does not tolerate other NSAIDs.


Start plavix 75 mg po daily


Neuro checks.


Consult PT/OT/speech therapy/neurology.





Right lower lobe pneumonia--


Start ceftriaxone 1 g IV daily.


Start clindamycin 900 mg IV every 8 hours.





COPD/tobacco use disorder--


Continue albuterol.


Smoking cessation.


Nicotine 21 mg patch daily.





Level of Care


Telemetry





Advanced Directives


Existing Advance Directive:  No


Existing Living Will:  No


Existing Power of :  No





Resuscitation Status


FULL RESUSCITATION





VTE Prophylaxis


Risk Level:  Moderate


Given or contraindicated:  SCD's





Social Service Consult


None Apply


Resident Involvement:  Resident Care Provided


Care Provided:  Adult Hospital Medicine

## 2017-09-22 NOTE — DIAGNOSTIC IMAGING REPORT
HEAD WITHOUT CONTRAST (CT)



CLINICAL HISTORY: 66 years-old Female presenting with EVALUATE ALTERED MENTAL

STATUS/WEAKNESS, slurred speech. 



TECHNIQUE: Multidetector CT imaging of the head was performed without the use of

intravenous contrast. IV contrast: None. A dose lowering technique was used

consistent with the principles of ALARA (as low as reasonably achievable). 



COMPARISON:  None.



CT DOSE (mGy.cm): The estimated cumulative dose is 1535.66 mGy.cm.



FINDINGS: 



 topogram: Unremarkable.



Posterior fossa cyst, likely arachnoid cyst. Ventricles and sulci normal in

size. Old lacunar infarct noted in the right basal ganglia. No mass effect or

midline shift. No hemorrhage or acute territorial infarct. No extra-axial fluid

collection. Paranasal sinuses and mastoid air cells clear. Calvarium intact.    



IMPRESSION:

1.  No acute intracranial pathology. 



2.  Old lacunar infarct in the right basal ganglia.







Electronically signed by:  Florentino Davidson M.D.

9/22/2017 5:27 PM



Dictated Date/Time:  9/22/2017 5:24 PM

## 2017-09-23 VITALS
OXYGEN SATURATION: 94 % | TEMPERATURE: 97.7 F | SYSTOLIC BLOOD PRESSURE: 156 MMHG | DIASTOLIC BLOOD PRESSURE: 71 MMHG | HEART RATE: 64 BPM

## 2017-09-23 VITALS
DIASTOLIC BLOOD PRESSURE: 74 MMHG | OXYGEN SATURATION: 93 % | SYSTOLIC BLOOD PRESSURE: 153 MMHG | HEART RATE: 62 BPM | TEMPERATURE: 98.24 F

## 2017-09-23 VITALS
SYSTOLIC BLOOD PRESSURE: 176 MMHG | OXYGEN SATURATION: 93 % | HEART RATE: 81 BPM | DIASTOLIC BLOOD PRESSURE: 89 MMHG | TEMPERATURE: 97.52 F

## 2017-09-23 VITALS
TEMPERATURE: 98.06 F | DIASTOLIC BLOOD PRESSURE: 81 MMHG | SYSTOLIC BLOOD PRESSURE: 162 MMHG | HEART RATE: 73 BPM | OXYGEN SATURATION: 92 %

## 2017-09-23 VITALS
SYSTOLIC BLOOD PRESSURE: 178 MMHG | HEART RATE: 73 BPM | TEMPERATURE: 97.7 F | OXYGEN SATURATION: 91 % | DIASTOLIC BLOOD PRESSURE: 83 MMHG

## 2017-09-23 VITALS — OXYGEN SATURATION: 95 %

## 2017-09-23 VITALS
TEMPERATURE: 98.24 F | OXYGEN SATURATION: 91 % | DIASTOLIC BLOOD PRESSURE: 82 MMHG | HEART RATE: 67 BPM | SYSTOLIC BLOOD PRESSURE: 178 MMHG

## 2017-09-23 LAB
ANION GAP SERPL CALC-SCNC: 7 MMOL/L (ref 3–11)
BASOPHILS # BLD: 0.03 K/UL (ref 0–0.2)
BASOPHILS NFR BLD: 0.4 %
BUN SERPL-MCNC: 10 MG/DL (ref 7–18)
BUN/CREAT SERPL: 15.8 (ref 10–20)
CALCIUM SERPL-MCNC: 8.8 MG/DL (ref 8.5–10.1)
CHLORIDE SERPL-SCNC: 103 MMOL/L (ref 98–107)
CHOLEST/HDLC SERPL: 4.9 {RATIO}
CO2 SERPL-SCNC: 32 MMOL/L (ref 21–32)
COMPLETE: YES
CREAT CL PREDICTED SERPL C-G-VRATE: 107 ML/MIN
CREAT SERPL-MCNC: 0.62 MG/DL (ref 0.6–1.2)
EOSINOPHIL NFR BLD AUTO: 223 K/UL (ref 130–400)
GLUCOSE SERPL-MCNC: 81 MG/DL (ref 70–99)
GLUCOSE UR QL: 47 MG/DL
HCT VFR BLD CALC: 44.8 % (ref 37–47)
IG%: 0.2 %
IMM GRANULOCYTES NFR BLD AUTO: 25.5 %
INR PPP: 1 (ref 0.9–1.1)
KETONES UR QL STRIP: 140 MG/DL
LYMPHOCYTES # BLD: 2.09 K/UL (ref 1.2–3.4)
MCH RBC QN AUTO: 30.1 PG (ref 25–34)
MCHC RBC AUTO-ENTMCNC: 32.4 G/DL (ref 32–36)
MCV RBC AUTO: 92.9 FL (ref 80–100)
MONOCYTES NFR BLD: 13.2 %
NEUTROPHILS # BLD AUTO: 1.6 %
NEUTROPHILS NFR BLD AUTO: 59.1 %
NITRITE UR QL STRIP: 206 MG/DL (ref 0–150)
PH UR: 228 MG/DL (ref 0–200)
PMV BLD AUTO: 11.3 FL (ref 7.4–10.4)
POTASSIUM SERPL-SCNC: 3.2 MMOL/L (ref 3.5–5.1)
PROTHROMBIN TIME: 10.7 SECONDS (ref 9–12)
RBC # BLD AUTO: 4.82 M/UL (ref 4.2–5.4)
SODIUM SERPL-SCNC: 142 MMOL/L (ref 136–145)
VERY LOW DENSITY LIPOPROT CALC: 41 MG/DL
WBC # BLD AUTO: 8.19 K/UL (ref 4.8–10.8)

## 2017-09-23 RX ADMIN — HEPARIN SODIUM SCH UNIT: 10000 INJECTION, SOLUTION INTRAVENOUS; SUBCUTANEOUS at 15:18

## 2017-09-23 RX ADMIN — SPIRONOLACTONE SCH MG: 25 TABLET, FILM COATED ORAL at 08:29

## 2017-09-23 RX ADMIN — CEFTRIAXONE SODIUM SCH MLS/HR: 1 INJECTION, POWDER, FOR SOLUTION INTRAVENOUS at 21:37

## 2017-09-23 RX ADMIN — FUROSEMIDE SCH MG: 40 TABLET ORAL at 08:31

## 2017-09-23 RX ADMIN — NICOTINE SCH PATCH: 21 PATCH, EXTENDED RELEASE TRANSDERMAL at 08:31

## 2017-09-23 RX ADMIN — ALBUTEROL SULFATE SCH PUFFS: 90 AEROSOL, METERED RESPIRATORY (INHALATION) at 08:29

## 2017-09-23 RX ADMIN — POTASSIUM CHLORIDE SCH MLS/HR: 10 INJECTION, SOLUTION INTRAVENOUS at 01:07

## 2017-09-23 RX ADMIN — TOLTERODINE TARTRATE SCH MG: 2 CAPSULE, EXTENDED RELEASE ORAL at 08:30

## 2017-09-23 RX ADMIN — CLINDAMYCIN PHOSPHATE SCH MLS/HR: 150 INJECTION, SOLUTION INTRAMUSCULAR; INTRAVENOUS at 23:24

## 2017-09-23 RX ADMIN — FUROSEMIDE SCH MG: 40 TABLET ORAL at 17:58

## 2017-09-23 RX ADMIN — VERAPAMIL HYDROCHLORIDE SCH MG: 240 TABLET, FILM COATED, EXTENDED RELEASE ORAL at 08:30

## 2017-09-23 RX ADMIN — HEPARIN SODIUM SCH UNIT: 10000 INJECTION, SOLUTION INTRAVENOUS; SUBCUTANEOUS at 05:42

## 2017-09-23 RX ADMIN — VERAPAMIL HYDROCHLORIDE SCH MG: 240 TABLET, FILM COATED, EXTENDED RELEASE ORAL at 21:37

## 2017-09-23 RX ADMIN — ALLOPURINOL SCH MG: 300 TABLET ORAL at 08:31

## 2017-09-23 RX ADMIN — POTASSIUM CHLORIDE SCH MEQ: 1500 TABLET, EXTENDED RELEASE ORAL at 08:31

## 2017-09-23 RX ADMIN — HEPARIN SODIUM SCH UNIT: 10000 INJECTION, SOLUTION INTRAVENOUS; SUBCUTANEOUS at 21:38

## 2017-09-23 RX ADMIN — CLINDAMYCIN PHOSPHATE SCH MLS/HR: 150 INJECTION, SOLUTION INTRAMUSCULAR; INTRAVENOUS at 17:59

## 2017-09-23 NOTE — NEUROLOGY CONSULTATION
Neurology Consultation


Date of Consultation:


Sep 23, 2017.


Attending Physician:


Adri Overton M.D.


Primary Care Physician:


Florentino Askew M.D.


Reason for Consultation:


Consultation for stroke like symptoms


History of Present Illness


Source:  patient, hospital records


This is a 66-year-old right handed female who presents with new onset right 

upper and lower extremity weakness since Tuesday. She reports that it happened 

after her eye surgery. She reports that she continues to take her aspirin on a 

daily basis without any missed medication doses. She is never had strokelike 

symptoms in the past. No seizure-like activity. No headaches in association. 

She reports that initially felt like her right arm and leg got numb and then 

weak. The numbness is resolved but she still has weakness. She thinks that 

maybe her speech got a little bit slurred but  did not appreciate this. 

Potentially could've been secondary to dry mouth since this seemed to occur 

after being started on spironolactone. She denies any changes with her vision. 

No loss of vision. No abnormal headaches. Denies any chest pain, heart 

palpitations, or shortness of breath.





Patient reports that her pupils were asymmetric yesterday because she had her 

left eye dilated at her eye appointment earlier in the day.





Patient does have chronic neck pain and reports history of cervical 

radiculopathy but she denies any current pain shooting down her arms. Denies 

any recent injuries.





Patient reports that she is not able to do MRI because she cannot lay flat plus 

has claustrophobia. She is able to tolerate CT scan but did not understand why 

she was getting another one which is why she refused.





CT of the head report and images were reviewed by myself. Signs of old right 

basal ganglia lacunar stroke.





Total cholesterol 228, , HDL 47, triglycerides 206, hemoglobin A1c from 

07/28/2017 was 5.4.





Echo results are pending





Past Medical/Surgical History


Medical Problems:


(1) HTN (hypertension)


Status: Chronic  





(2) PNA (pneumonia)


Status: Acute  





(3) Right sided weakness


Status: Acute  





COPD, hypertension, cervical radiculopathy and neck pain, hypertension, 

prediabetes with peripheral neuropathy





Family History


Father with CAD and lung cancer


Brother with CAD


Diabetes in the family





Social History


Positive every day tobacco use. Drinks maybe one drink per day. No illegal drug 

use.


Smokeless Tobacco Use:  No


Alcohol Use:  none


Drug Use:  none


Marital Status:  


Occupation Status:  employed





Allergies


Coded Allergies:  


     Grass (Verified  Allergy, Intermediate, nasal conjestion,watery eyes, 9/22/ 17)


     Ibuprofen (Verified  Allergy, Intermediate, hives, post nasal drip, TAKES 

ASA AT HOME, 9/22/17)


     Indomethacin (Verified  Allergy, Intermediate, hives, TAKES ASA AT HOME, 9/ 22/17)


     NSAIDs (Verified  Allergy, Intermediate, hives, TAKES ASA AT HOME, 9/22/17)


     Naproxen (Verified  Allergy, Intermediate, hives, 9/22/17)


     Ragweed (Verified  Allergy, Intermediate, nasal conjestion, watery eyes, 9/ 22/17)


     Ampicillin (Unverified  Allergy, Mild, N/V AND HIVES, 9/22/17)


     Salicylates (Unverified  Allergy, Mild, DIARRHEA, RASH, 9/22/17)


 LOW-DOSE ASPIRIN OKAY


     Statins (Verified  Allergy, Unknown, MUSCLE ACHES, 9/22/17)


Uncoded Allergies:  


     acryllic (Allergy, Intermediate, nasal conjestion, wheezing, 10/29/14)





Current Inpatient Medications





Current Inpatient Medications








 Medications


  (Trade)  Dose


 Ordered  Sig/Shiv


 Route  Start Time


 Stop Time Status Last Admin


Dose Admin


 


 Miscellaneous


 Information


  (Pharmacist


 Discharge Med Rec


 Consult)  1 ea  UD  PRN


 N/A  9/22/17 20:15


 10/22/17 20:14   


 


 


 Heparin Sodium


  (Porcine)


  (Heparin Sq 5000


 Unit/0.5ml)  5,000 unit  Q8


 SQ  9/22/17 22:00


 10/22/17 21:59  9/23/17 05:42


5,000 UNIT


 


 Acetaminophen


  (Tylenol Tab)  650 mg  Q4H  PRN


 PO  9/22/17 20:30


 10/22/17 20:29   


 


 


 Al Hydrox/Mg


 Hydrox/Simethicone


  (Maalox Max Susp)  15 ml  Q4H  PRN


 PO  9/22/17 20:30


 10/22/17 20:29   


 


 


 Magnesium


 Hydroxide


  (Milk Of


 Magnesia Susp)  30 ml  Q12H  PRN


 PO  9/22/17 20:30


 10/22/17 20:29   


 


 


 Zolpidem Tartrate


  (Ambien Tab)  5 mg  HSZ  PRN


 PO  9/22/17 20:30


 10/22/17 20:29   


 


 


 Ondansetron HCl


  (Zofran Inj)  4 mg  Q6H  PRN


 IV  9/22/17 20:30


 10/22/17 20:29   


 


 


 Nitroglycerin


  (Nitrostat Tab)  0.4 mg  UD  PRN


 SL  9/22/17 20:30


 10/22/17 20:29   


 


 


 Polyethylene


  (Miralax Powder


 Packet)  17 gm  DAILY  PRN


 PO  9/22/17 20:30


 10/22/17 20:29   


 


 


 Albuterol


  (Ventolin Hfa


 Inhaler)  2 puffs  DAILY


 INH  9/23/17 09:00


 10/23/17 08:59  9/23/17 08:29


2 PUFFS


 


 Allopurinol


  (Zyloprim Tab)  300 mg  QAM


 PO  9/23/17 09:00


 10/23/17 08:59  9/23/17 08:31


300 MG


 


 Furosemide


  (Lasix Tab)  40 mg  BID17


 PO  9/22/17 21:00


 10/22/17 20:59  9/23/17 08:31


40 MG


 


 Nicotine


  (Nicoderm Cq


 21MG Patch)  1 patch  DAILY


 TD  9/23/17 09:00


 10/23/17 08:59  9/23/17 08:31


1 PATCH


 


 Spironolactone


  (Aldactone Tab)  25 mg  DAILY


 PO  9/23/17 09:00


 10/23/17 08:59  9/23/17 08:29


25 MG


 


 Tolterodine


 Tartrate


  (Detrol LA Cap)  2 mg  DAILY


 PO  9/23/17 09:00


 10/23/17 08:59  9/23/17 08:30


2 MG


 


 Verapamil HCl


  (Calan-Sr Tab)  240 mg  BID


 PO  9/22/17 21:00


 10/22/17 20:59  9/23/17 08:30


240 MG


 


 Potassium Chloride


  (Klor-Con Tab)  20 meq  QAM


 PO  9/23/17 09:00


 10/23/17 08:59  9/23/17 08:31


20 MEQ


 


 Ceftriaxone


 Sodium 1 gm/


 Dextrose  50 ml @ 


 100 mls/hr  Q24H


 IV  9/22/17 23:00


 9/29/17 22:59  9/22/17 23:53


100 MLS/HR


 


 Ioversol


  (Optiray 320)  100 ml  UD  PRN


 IV  9/22/17 23:00


 9/26/17 22:59   


 


 


 Clindamycin


 Phosphate 900 mg/


 Dextrose  106 ml @ 


 100 mls/hr  Q8H


 IV  9/23/17 16:00


 9/30/17 15:59   


 











Review of Systems


Complete review systems otherwise negative except for the above noted in 

history of present illness





Physical Exam


Vital Signs (Past 24 Hrs):











  Date Time  Temp Pulse Resp B/P (MAP) Pulse Ox O2 Delivery O2 Flow Rate FiO2


 


9/23/17 08:00     95 Nasal Cannula 4.0 


 


9/23/17 07:17 36.8 62 22 153/74 (100) 93 Nasal Cannula 2.0 


 


9/23/17 04:00     94 Nasal Cannula 2.0 


 


9/23/17 04:00 36.5 64 20 156/71 (99) 93 Nasal Cannula 2.0 


 


9/22/17 23:59     94 Nasal Cannula 2.0 


 


9/22/17 22:30 36.7 74 18 173/88 94 Nasal Cannula 2.0 


 


9/22/17 21:22  84 24 215/102 94   


 


9/22/17 19:27  71 25 152/78 94 Nasal Cannula 2.0 


 


9/22/17 19:21  75  192/113    


 


9/22/17 19:04  79 23 200/112 90 Room Air  


 


9/22/17 17:02      Room Air  


 


9/22/17 17:01      Room Air  


 


9/22/17 16:45  93      


 


9/22/17 16:20 36.8 96 22 199/89 93 Room Air  








Gen.: Patient is alert and sitting  in bed, in no acute distress.


HEENT: Normocephalic /atraumatic, no scleral icterus


Heart: Regular rate and rhythm


Extremities: No gross deformities or rashes noted


Neurological examination: Mental status:  Patient is alert and oriented  x3. 

Attention and concentration normal for the situation. Good fund of knowledge. 

Able to give her own history. 


Speech is fluent without any dysarthria or aphasia noted


Cranial nerve: Funduscopic examination was unremarkable. No papilledema. Pupils 

equally round and reactive to light. Extraocular muscles intact without 

nystagmus. No facial asymmetry noted. Facial sensation intact. Tongue is 

midline. Good palatal elevation. Good shoulder shrug bilaterally. Hearing 

grossly intact to voice. 


Strength: 5/5 both proximal and distally in left upper and lower extremity with 

some give way weakness with left hip flexion. Right upper extremity 4/5 with 

arm drift. Right lower extremity 4+/5 proximally and 5/5 distally.


Sensation: Grossly intact to light touch in all extremities.


Deep tendon reflexes: +1 in bilateral biceps, brachioradialis and patellar. 

Toes were downgoing to plantar stimulation on the left but possibly had initial 

right upgoing toe to plantar stimulation that was not reproducible due to 

withdrawal of foot.


Coordination: Patient had good finger to nose without dysmetria. Had difficult 

time performing on the right due to weakness.


Station sitting on the side of the bed was normal





Laboratory Results


Past 24 Hours:


9/23/17 05:14








Red Blood Count 4.82, Mean Corpuscular Volume 92.9, Mean Corpuscular Hemoglobin 

30.1, Mean Corpuscular Hemoglobin Concent 32.4, Mean Platelet Volume 11.3, 

Neutrophils (%) (Auto) 59.1, Lymphocytes (%) (Auto) 25.5, Monocytes (%) (Auto) 

13.2, Eosinophils (%) (Auto) 1.6, Basophils (%) (Auto) 0.4, Neutrophils # (Auto

) 4.84, Lymphocytes # (Auto) 2.09, Monocytes # (Auto) 1.08, Eosinophils # (Auto

) 0.13, Basophils # (Auto) 0.03





9/23/17 05:14

















Test


  9/22/17


16:50 9/22/17


18:25 9/23/17


05:14


 


Activated Partial


Thromboplast Time 31.4 SECONDS


(21.0-31.0) 


  


 


 


Partial Thromboplastin Ratio 1.2   


 


Magnesium Level


  2.2 mg/dl


(1.8-2.4) 


  


 


 


Total Bilirubin


  0.5 mg/dl


(0.2-1) 


  


 


 


Direct Bilirubin


  0.1 mg/dl


(0-0.2) 


  


 


 


Aspartate Amino Transf


(AST/SGOT) 14 U/L (15-37) 


  


  


 


 


Alanine Aminotransferase


(ALT/SGPT) 17 U/L (12-78) 


  


  


 


 


Alkaline Phosphatase


  84 U/L


() 


  


 


 


Troponin I


  < 0.015 ng/ml


(0-0.045) 


  


 


 


Total Protein


  7.1 gm/dl


(6.4-8.2) 


  


 


 


Albumin


  3.7 gm/dl


(3.4-5.0) 


  


 


 


Thyroid Stimulating Hormone


(TSH) 3.500 uIu/ml


(0.300-4.500) 


  


 


 


Urine Color  YELLOW  


 


Urine Appearance  CLEAR (CLEAR)  


 


Urine pH  7.5 (4.5-7.5)  


 


Urine Specific Gravity


  


  1.013


(1.000-1.030) 


 


 


Urine Protein  NEG (NEG)  


 


Urine Glucose (UA)  NEG (NEG)  


 


Urine Ketones  NEG (NEG)  


 


Urine Occult Blood  NEG (NEG)  


 


Urine Nitrite  NEG (NEG)  


 


Urine Bilirubin  NEG (NEG)  


 


Urine Urobilinogen  NEG (NEG)  


 


Urine Leukocyte Esterase  NEG (NEG)  


 


White Blood Count


  


  


  8.19 K/uL


(4.8-10.8)


 


Red Blood Count


  


  


  4.82 M/uL


(4.2-5.4)


 


Hemoglobin


  


  


  14.5 g/dL


(12.0-16.0)


 


Hematocrit   44.8 % (37-47) 


 


Mean Corpuscular Volume


  


  


  92.9 fL


()


 


Mean Corpuscular Hemoglobin


  


  


  30.1 pg


(25-34)


 


Mean Corpuscular Hemoglobin


Concent 


  


  32.4 g/dl


(32-36)


 


Platelet Count


  


  


  223 K/uL


(130-400)


 


Mean Platelet Volume


  


  


  11.3 fL


(7.4-10.4)


 


Neutrophils (%) (Auto)   59.1 % 


 


Lymphocytes (%) (Auto)   25.5 % 


 


Monocytes (%) (Auto)   13.2 % 


 


Eosinophils (%) (Auto)   1.6 % 


 


Basophils (%) (Auto)   0.4 % 


 


Neutrophils # (Auto)


  


  


  4.84 K/uL


(1.4-6.5)


 


Lymphocytes # (Auto)


  


  


  2.09 K/uL


(1.2-3.4)


 


Monocytes # (Auto)


  


  


  1.08 K/uL


(0.11-0.59)


 


Eosinophils # (Auto)


  


  


  0.13 K/uL


(0-0.5)


 


Basophils # (Auto)


  


  


  0.03 K/uL


(0-0.2)


 


RDW Standard Deviation


  


  


  48.1 fL


(36.4-46.3)


 


RDW Coefficient of Variation


  


  


  14.2 %


(11.5-14.5)


 


Immature Granulocyte % (Auto)   0.2 % 


 


Immature Granulocyte # (Auto)


  


  


  0.02 K/uL


(0.00-0.02)


 


Prothrombin Time


  


  


  10.7 SECONDS


(9.0-12.0)


 


Prothromb Time International


Ratio 


  


  1.0 (0.9-1.1) 


 


 


Anion Gap


  


  


  7.0 mmol/L


(3-11)


 


Est Creatinine Clear Calc


Drug Dose 


  


  107.0 ml/min 


 


 


Estimated GFR (


American) 


  


  107.4 


 


 


Estimated GFR (Non-


American 


  


  92.7 


 


 


BUN/Creatinine Ratio   15.8 (10-20) 


 


Calcium Level


  


  


  8.8 mg/dl


(8.5-10.1)


 


Triglycerides Level


  


  


  206 mg/dl


(0-150)


 


Cholesterol Level


  


  


  228 mg/dl


(0-200)


 


HDL Cholesterol   47 mg/dl 


 


LDL Cholesterol, Calculated   140 mg/dl 


 


VLDL Cholesterol, Calculated   41 mg/dl 


 


Cholesterol/HDL Ratio   4.9 











Imaging


As noted above in history of present illness





Impression


This is a 66-year-old right-handed female with acute right hemiplegia and 

resolved right sensory deficits starting on Tuesday. Overall presentation is 

concerning for a left hemispheric stroke. Differential diagnosis could include 

a cervical lesion since it is not clear that the patient has had any facial 

involvement. Reported slurred speech was not well appreciated by family members 

and could have been due to dry mouth. 


Known stroke risk factors include hypertension, dyslipidemia, prediabetes, and 

tobacco abuse.





Plan


Explained and encouraged patient to receive CTA of the head and neck for 

further evaluation of stroke and stroke risk factors since she is not able to 

lay flat and tolerate MRI.





Agree with switching aspirin to Plavix for secondary stroke prevention.








Follow-up PT/OT and speech therapies for discharge planning (patient would 

likely benefit from inpatient rehabilitation)





Blood pressure recommendations while in hospital 175//80


Avoid hypotension and dehydration 





Stroke risk factor modifications and recommendations:


Blood pressure recommendations for the first month post hospital discharge 150/

/80, and after that blood pressure recommendations 130//70


Total cholesterol goal 100- 200 and LDL goal less than 70


Hemoglobin A1c goal less than 7


Encourage cardiovascular exercise at least 3 times a week for 30 minutes.


Smoking cessation





Follow-up in neurology clinic in 1 month for post stroke hospital follow-up.


If CTA is not supportive of a left hemispheric stroke, could consider CT or MRI 

of the neck to rule out cervical etiologies for right hemiplegia and could 

consider EMG as an outpatient.





Thank you for allowing me to participate in this patient's care. If there is 

any questions or concerns, feel free to call/page me.

## 2017-09-23 NOTE — DIAGNOSTIC IMAGING REPORT
NECK CTA



HISTORY:      STROKE



TECHNIQUE: Multiaxial CT images of the neck were performed following the

intravenous administration of contrast to evaluate the major cervical vessels.

Maximum intensity projection images were also obtained. All measurements were

calculated based on NASCET criteria.  A dose lowering technique was utilized

adhering to the principles of ALARA.



COMPARISON STUDY:  None.



FINDINGS: The aortic arch and proximal great vessels are widely patent.  There

is no significant stenosis, occlusion, or dissection identified within the

bilateral common carotid, internal carotid, or left vertebral arteries. Mild

focal stenosis within the intracranial portion of the distal right vertebral

artery. The cervical portion of the right vertebral artery is widely patent. The

bilateral distal internal carotid arteries are tortuous. The bilateral internal

jugular veins are patent.



IMPRESSION:  

No significant stenosis, occlusion, or dissection identified within the carotid

or cervical vertebral arteries. Mild focal stenosis within the intracranial

portion of the distal right vertebral artery.







Electronically signed by:  Willis Urrutia M.D.

9/23/2017 5:12 PM



Dictated Date/Time:  9/23/2017 5:09 PM

## 2017-09-23 NOTE — FAMILY MEDICINE PROGRESS NOTE
Progress Note


Date of Service


Sep 23, 2017.





Subjective


Pt evaluation today including:  conversation w/ patient, physical exam, chart 

review, lab review, review of studies


Found pt lying comfortably, conversing easily with slurred speech, in NAD.  

Says she has no new pains (has chronic extremity pains), CP, SOB, or acute 

concerns.  Wonders when she's going home.





   Constitutional:  No fever, No chills


   Respiratory:  No cough, No shortness of breath


   Cardiovascular:  No chest pain, No edema


   Abdomen:  No pain, No nausea, No vomiting


   Neurologic:  + weakness, + problem reported (slurred speech)





Medications





Current Inpatient Medications








 Medications


  (Trade)  Dose


 Ordered  Sig/Shiv


 Route  Start Time


 Stop Time Status Last Admin


Dose Admin


 


 Miscellaneous


 Information


  (Pharmacist


 Discharge Med Rec


 Consult)  1 ea  UD  PRN


 N/A  9/22/17 20:15


 10/22/17 20:14   


 


 


 Heparin Sodium


  (Porcine)


  (Heparin Sq 5000


 Unit/0.5ml)  5,000 unit  Q8


 SQ  9/22/17 22:00


 10/22/17 21:59  9/23/17 05:42


5,000 UNIT


 


 Acetaminophen


  (Tylenol Tab)  650 mg  Q4H  PRN


 PO  9/22/17 20:30


 10/22/17 20:29   


 


 


 Al Hydrox/Mg


 Hydrox/Simethicone


  (Maalox Max Susp)  15 ml  Q4H  PRN


 PO  9/22/17 20:30


 10/22/17 20:29   


 


 


 Magnesium


 Hydroxide


  (Milk Of


 Magnesia Susp)  30 ml  Q12H  PRN


 PO  9/22/17 20:30


 10/22/17 20:29   


 


 


 Zolpidem Tartrate


  (Ambien Tab)  5 mg  HSZ  PRN


 PO  9/22/17 20:30


 10/22/17 20:29   


 


 


 Ondansetron HCl


  (Zofran Inj)  4 mg  Q6H  PRN


 IV  9/22/17 20:30


 10/22/17 20:29   


 


 


 Nitroglycerin


  (Nitrostat Tab)  0.4 mg  UD  PRN


 SL  9/22/17 20:30


 10/22/17 20:29   


 


 


 Polyethylene


  (Miralax Powder


 Packet)  17 gm  DAILY  PRN


 PO  9/22/17 20:30


 10/22/17 20:29   


 


 


 Albuterol


  (Ventolin Hfa


 Inhaler)  2 puffs  DAILY


 INH  9/23/17 09:00


 10/23/17 08:59  9/23/17 08:29


2 PUFFS


 


 Allopurinol


  (Zyloprim Tab)  300 mg  QAM


 PO  9/23/17 09:00


 10/23/17 08:59  9/23/17 08:31


300 MG


 


 Furosemide


  (Lasix Tab)  40 mg  BID17


 PO  9/22/17 21:00


 10/22/17 20:59  9/23/17 08:31


40 MG


 


 Nicotine


  (Nicoderm Cq


 21MG Patch)  1 patch  DAILY


 TD  9/23/17 09:00


 10/23/17 08:59  9/23/17 08:31


1 PATCH


 


 Spironolactone


  (Aldactone Tab)  25 mg  DAILY


 PO  9/23/17 09:00


 10/23/17 08:59  9/23/17 08:29


25 MG


 


 Tolterodine


 Tartrate


  (Detrol LA Cap)  2 mg  DAILY


 PO  9/23/17 09:00


 10/23/17 08:59  9/23/17 08:30


2 MG


 


 Verapamil HCl


  (Calan-Sr Tab)  240 mg  BID


 PO  9/22/17 21:00


 10/22/17 20:59  9/23/17 08:30


240 MG


 


 Potassium Chloride


  (Klor-Con Tab)  20 meq  QAM


 PO  9/23/17 09:00


 10/23/17 08:59  9/23/17 08:31


20 MEQ


 


 Ceftriaxone


 Sodium 1 gm/


 Dextrose  50 ml @ 


 100 mls/hr  Q24H


 IV  9/22/17 23:00


 9/29/17 22:59  9/22/17 23:53


100 MLS/HR


 


 Ioversol


  (Optiray 320)  100 ml  UD  PRN


 IV  9/22/17 23:00


 9/26/17 22:59   


 


 


 Clindamycin


 Phosphate 900 mg/


 Dextrose  56 ml @ 


 100 mls/hr  Q8H


 IV  9/23/17 08:00


 9/30/17 00:00  9/23/17 08:28


100 MLS/HR











Objective


Vital Signs











  Date Time  Temp Pulse Resp B/P (MAP) Pulse Ox O2 Delivery O2 Flow Rate FiO2


 


9/23/17 07:17 36.8 62 22 153/74 (100) 93 Nasal Cannula 2.0 


 


9/23/17 04:00     94 Nasal Cannula 2.0 


 


9/23/17 04:00 36.5 64 20 156/71 (99) 93 Nasal Cannula 2.0 


 


9/22/17 23:59     94 Nasal Cannula 2.0 


 


9/22/17 22:30 36.7 74 18 173/88 94 Nasal Cannula 2.0 


 


9/22/17 21:22  84 24 215/102 94   


 


9/22/17 19:27  71 25 152/78 94 Nasal Cannula 2.0 


 


9/22/17 19:21  75  192/113    


 


9/22/17 19:04  79 23 200/112 90 Room Air  


 


9/22/17 17:02      Room Air  


 


9/22/17 17:01      Room Air  


 


9/22/17 16:45  93      


 


9/22/17 16:20 36.8 96 22 199/89 93 Room Air  











Physical Exam


General Appearance:  no apparent distress


Respiratory/Chest:  lungs clear, normal breath sounds, no respiratory distress


Cardiovascular:  regular rate, rhythm, no edema, no murmur


Abdomen:  normal bowel sounds, non tender, soft


Extremities:  no pedal edema


Neurologic/Psychiatric:  alert, normal mood/affect, oriented x 3, + motor 

weakness


Notes:


- RUE and RLE 4/5 strength.  LUE and LLE 5/5 strength.  Some RUE drift.  Distal 

sensation grossly equal/intact.





Laboratory Results


9/23/17 05:14








Red Blood Count 4.82, Mean Corpuscular Volume 92.9, Mean Corpuscular Hemoglobin 

30.1, Mean Corpuscular Hemoglobin Concent 32.4, Mean Platelet Volume 11.3, 

Neutrophils (%) (Auto) 59.1, Lymphocytes (%) (Auto) 25.5, Monocytes (%) (Auto) 

13.2, Eosinophils (%) (Auto) 1.6, Basophils (%) (Auto) 0.4, Neutrophils # (Auto

) 4.84, Lymphocytes # (Auto) 2.09, Monocytes # (Auto) 1.08, Eosinophils # (Auto

) 0.13, Basophils # (Auto) 0.03





9/23/17 05:14

















Test


  9/22/17


16:50 9/22/17


18:25 9/23/17


05:14


 


Activated Partial


Thromboplast Time 31.4 SECONDS


(21.0-31.0) 


  


 


 


Partial Thromboplastin Ratio 1.2   


 


Magnesium Level


  2.2 mg/dl


(1.8-2.4) 


  


 


 


Total Bilirubin


  0.5 mg/dl


(0.2-1) 


  


 


 


Direct Bilirubin


  0.1 mg/dl


(0-0.2) 


  


 


 


Aspartate Amino Transf


(AST/SGOT) 14 U/L (15-37) 


  


  


 


 


Alanine Aminotransferase


(ALT/SGPT) 17 U/L (12-78) 


  


  


 


 


Alkaline Phosphatase


  84 U/L


() 


  


 


 


Troponin I


  < 0.015 ng/ml


(0-0.045) 


  


 


 


Total Protein


  7.1 gm/dl


(6.4-8.2) 


  


 


 


Albumin


  3.7 gm/dl


(3.4-5.0) 


  


 


 


Thyroid Stimulating Hormone


(TSH) 3.500 uIu/ml


(0.300-4.500) 


  


 


 


Urine Color  YELLOW  


 


Urine Appearance  CLEAR (CLEAR)  


 


Urine pH  7.5 (4.5-7.5)  


 


Urine Specific Gravity


  


  1.013


(1.000-1.030) 


 


 


Urine Protein  NEG (NEG)  


 


Urine Glucose (UA)  NEG (NEG)  


 


Urine Ketones  NEG (NEG)  


 


Urine Occult Blood  NEG (NEG)  


 


Urine Nitrite  NEG (NEG)  


 


Urine Bilirubin  NEG (NEG)  


 


Urine Urobilinogen  NEG (NEG)  


 


Urine Leukocyte Esterase  NEG (NEG)  


 


White Blood Count


  


  


  8.19 K/uL


(4.8-10.8)


 


Red Blood Count


  


  


  4.82 M/uL


(4.2-5.4)


 


Hemoglobin


  


  


  14.5 g/dL


(12.0-16.0)


 


Hematocrit   44.8 % (37-47) 


 


Mean Corpuscular Volume


  


  


  92.9 fL


()


 


Mean Corpuscular Hemoglobin


  


  


  30.1 pg


(25-34)


 


Mean Corpuscular Hemoglobin


Concent 


  


  32.4 g/dl


(32-36)


 


Platelet Count


  


  


  223 K/uL


(130-400)


 


Mean Platelet Volume


  


  


  11.3 fL


(7.4-10.4)


 


Neutrophils (%) (Auto)   59.1 % 


 


Lymphocytes (%) (Auto)   25.5 % 


 


Monocytes (%) (Auto)   13.2 % 


 


Eosinophils (%) (Auto)   1.6 % 


 


Basophils (%) (Auto)   0.4 % 


 


Neutrophils # (Auto)


  


  


  4.84 K/uL


(1.4-6.5)


 


Lymphocytes # (Auto)


  


  


  2.09 K/uL


(1.2-3.4)


 


Monocytes # (Auto)


  


  


  1.08 K/uL


(0.11-0.59)


 


Eosinophils # (Auto)


  


  


  0.13 K/uL


(0-0.5)


 


Basophils # (Auto)


  


  


  0.03 K/uL


(0-0.2)


 


RDW Standard Deviation


  


  


  48.1 fL


(36.4-46.3)


 


RDW Coefficient of Variation


  


  


  14.2 %


(11.5-14.5)


 


Immature Granulocyte % (Auto)   0.2 % 


 


Immature Granulocyte # (Auto)


  


  


  0.02 K/uL


(0.00-0.02)


 


Prothrombin Time


  


  


  10.7 SECONDS


(9.0-12.0)


 


Prothromb Time International


Ratio 


  


  1.0 (0.9-1.1) 


 


 


Anion Gap


  


  


  7.0 mmol/L


(3-11)


 


Est Creatinine Clear Calc


Drug Dose 


  


  107.0 ml/min 


 


 


Estimated GFR (


American) 


  


  107.4 


 


 


Estimated GFR (Non-


American 


  


  92.7 


 


 


BUN/Creatinine Ratio   15.8 (10-20) 


 


Calcium Level


  


  


  8.8 mg/dl


(8.5-10.1)


 


Triglycerides Level


  


  


  206 mg/dl


(0-150)


 


Cholesterol Level


  


  


  228 mg/dl


(0-200)


 


HDL Cholesterol   47 mg/dl 


 


LDL Cholesterol, Calculated   140 mg/dl 


 


VLDL Cholesterol, Calculated   41 mg/dl 


 


Cholesterol/HDL Ratio   4.9 











Assessment and Plan


67 yo female c/o right arm and leg weakness noted on Tues 19Sep, ? similar sx 

but less pronounced during prior week.  





Right-sided weakness and slurred speech: Negative CT scan in ED. Delining 

further impaging - MRA head and neck, and MRI brain, stating she cannot remain 

lying down for that long.  


- Started on plavix.  Stopped home ASA 81.  EKG 22Sep NSR 88.  


- Pt has allergy to statins.  


- 50Upo59 CT head noted nothing acute but old lacunar infarct in right basal 

ganglia.  


- Started neuro checks.  


[ ] Ordered CTA head and neck in lieu of above. 


[ ] Pending consults to neuro, speech therapy, PT, OT - appreciate expertise.  


- Started on aspiration precautions.  


[ ] Echocardiogram ordered. 





Pneumonia: Denies known recent illness, fevers, cough, N/V.  On admit afebrile, 

clear lungs, no leukocytosis on labs.  


- 22Sep pCXR one view noted pulmonary vascular congestion and mild superimposed 

infiltrate right base. 


- Question if source is aspiration.  Reportedly pt failed dysphagia screen in 

the ED, then got a moisturized swab of the mouth and passed her dysphagia 

screen on the floor. 


- 23Sep started on clindamycin 900 mg IV q8h and ceftriaxone 1 gram IV daily.  





Hypokalemia: K 3.2 on admit.  Replaced via IV.  





COPD: Reported hx smoking x 50 years, perhaps 1/2 to 1 ppd.  


- On home albuterol.  


- Nicotine 21mg TD patch. 





HTN:  


- On home Verapamil  mg BID, Lasix 40 mg BID, and Spironolactone 25mg 

daily. 


- Elevated on admit, given metoprolol 5 mg IV.  





Urinary incontinence: Continue home toleterodine as inpt.  





Prediabetes: Per report.  Glc so far were 94 & 81.  


[ ] Will check HbA1c.  





Disposition: Pending 


- Uses a motorized scooter to get around at baseline.


- PT and OT onboard, appreciate recs. 





DVT prophy: Heparin q8h.  


Code status: Full code





Discussed the above on attending rounds this morning.  SABAS, PGY1 Family Medicine


-------------





Resident Tracking


Resident Involvement:  Resident Care Provided


Care Provided:  Adult Hospital Medicine (inpt rounds)





Reviewed:  Pt Seen/Exam by Me


History


no new concerns


left arm weakness getting better


speech clear


Constitutional:  denies: fever


Respiratory:  negative: short of breath


Cardiovascular:  denies chest pain


General Appearance:  no apparent distress


Respiratory:  lungs clear, no respiratory distress


Cardiovascular:  regular rate, rhythm


Neurologic/Psychiatric:  alert, oriented x 3, other (left arm with 4/5 

strength. )


Assessment/Plan


Resident Physician Supervision Note:


I was present with Dr. Okeefe  in bedside. I verified the key history and 

physical, reviewed labs and image studies, discussed the case with the resident 

and agree with the findings and care plan.

## 2017-09-23 NOTE — ECHOCARDIOGRAM REPORT
*NOTICE TO RECEIVING PARTY AGENCY**  This information is strictly Confidential and protected under 
[place] law.  [place] law prohibits you from making any further disclosure of this 
information unless further disclosure is expressly permitted by the written consent of the person to 
whom it pertains or is authorized by law.  A general authorization for the release of medical or 
other information is not sufficient for this purpose.  Hospital accepts no responsibility if the 
information is made available to any other person, INCLUDING THE PATIENT.



Interpretation Summary

  *  Name: JARON العلي  Study Date: 2017 08:31 AM  BP: 156/71 mmHg

  *  MRN: [id number]  Patient Location: C.2T\S\S230\S\2  HR: 64

  *  : 1950 (M/d/yyyy)  Gender: Female  Height: 64 in

  *  Age: 66 yrs  Ethnicity: [place]  Weight: 253 lb

  *  Ordering Physician: Chencho Rinaldi

  *  Referring Physician: Self, Referred

  *  Performed By: Gissell Dunbar RDCS

  *  Accession# [id number]  Account# [id number]

  *  Reason For Study: Cerebral ischemia/embolus

  *  BSA: 2.2 m2

  *  -- Conclusions --

  *  1. Normal left ventricular size with hyperdynamic systolic function.  EF 65-70%.  No regional 
wall motion abnormalities.  Mild concentric left ventricular hypertrophy.

  *  2. The left atrium is moderately dilated.

  *  3. Aortic valve sclerosis mild, without significant aortic valvular stenosis.

  *  4. Technically difficult study, enhanced with IV Definity.

  *  5. No prior study available for comparison.

Procedure Details

  *  A complete two-dimensional transthoracic echocardiogram was performed (2D, M-mode, Doppler and 
color flow Doppler).

  *  A saline contrast injection was performed to assess for cardiac shunting.

  *  The injection was performed through an intravenous line in the left arm.

  *  The attending nurse who injected the saline contrast was Angely Purvis RN.

  *  A total of 20 cc of agitated saline was given.

  *  A contrast injection of Definity was performed to improve assessment of LV function.

  *  Contrast was injected into an intravenous site in the left arm.

  *  One vial of Definity ultrasound contrast was diluted in normal saline to a total volume of 10 
ml.  A total of '2' ml of solution was administered during imaging.

  *  Lot # [id number] of Definity utilized for procedure.

  *  Expiration date 1 OCT 18.

  *  The attending nurse who injected the contrast agent was Angely Purvis RN.

Left Ventricle

  *  Normal left ventricular size with hyperdynamic systolic function.  EF 65-70%.  No regional wall 
motion abnormalities.  Mild concentric left ventricular hypertrophy.

Right Ventricle

  *  The right ventricle is normal in size and function.

  *  The right ventricular systolic function is normal as assessed by tricuspid annular plane 
systolic excursion (TAPSE) (normal >1.5 cm).

Atria

  *  The left atrium is moderately dilated.

  *  Right atrial size is normal.

  *  No visualized ASD.  Inadequate image quality following agitated saline injection.

Mitral Valve

  *  There is mild to moderate mitral annular calcification.

  *  There is no mitral valve stenosis.

  *  No visualized significant mitral regurgitation.

Tricuspid Valve

  *  The tricuspid valve is not well visualized.

  *  There is no tricuspid stenosis.

  *  Significant tricuspid regurgitation is absent.

Aortic Valve

  *  The aortic valve is trileaflet.

  *  Aortic valve sclerosis mild, without significant aortic valvular stenosis.

  *  No hemodynamically significant valvular aortic stenosis.

  *  No aortic regurgitation is present.

Pulmonic Valve

  *  The pulmonary valve is inadequately visualized, but the Doppler data is adequate for 
interpretation.

  *  There is no pulmonic valvular stenosis.

  *  There is no significant pulmonary regurgitation.

Great Vessels

  *  The aortic root is normal size.

  *  Ascending aorta of normal dimension

  *  Aortic arch of normal dimension.

Pericardium/Pleural

  *  There is no pericardial effusion.

Great Vessels

  *  Normal inferior vena cava size and collapsability with sniff indicates a normal right atrial 
pressure of 3 mmHg



MMode 2D Measurements and Calculations

IVSd 1.2 cm



LVIDd 3.3 cm

LVIDs 2.1 cm

LVPWd 1.2 cm



IVS/LVPW 1.0 

FS 37.1 %

EDV(Teich) 44.9 ml

ESV(Teich) 14.3 ml

EF(Teich) 68.3 %



EDV(cubed) 36.8 ml

ESV(cubed) 9.2 ml

EF(cubed) 75.1 %





LV mass(C)d 126.9 grams

LV mass(C)dI 58.7 grams/m\S\2



SV(Teich) 30.7 ml

SI(Teich) 14.2 ml/m\S\2

SV(cubed) 27.6 ml

SI(cubed) 12.8 ml/m\S\2



Ao root diam 3.4 cm

Ao root area 9.0 cm\S\2

ACS 2.1 cm



asc Aorta Diam 2.7 cm





LVAd ap4 32.4 cm\S\2

LVLd ap4 7.7 cm

EDV(MOD-sp4) 111.9 ml

EDV(sp4-el) 115.7 ml

LVAs ap4 17.2 cm\S\2

LVLs ap4 6.9 cm

ESV(MOD-sp4) 36.4 ml

ESV(sp4-el) 36.6 ml

EF(MOD-sp4) 67.5 %

EF(sp4-el) 68.4 %



LVAd ap2 31.2 cm\S\2

LVLd ap2 7.3 cm

EDV(MOD-sp2) 109.5 ml

EDV(sp2-el) 113.3 ml

LVAs ap2 17.4 cm\S\2

LVLs ap2 6.5 cm

ESV(MOD-sp2) 39.5 ml

ESV(sp2-el) 39.9 ml

EF(MOD-sp2) 64.0 %

EF(sp2-el) 64.8 %



LVLd %diff -5.50 %

EDV(MOD-bp) 109.9 ml

LVLs %diff -6.12 %

ESV(MOD-bp) 37.8 ml

EF(MOD-bp) 65.6 %



SV(MOD-sp4) 75.6 ml

SI(MOD-sp4) 35.0 ml/m\S\2





SV(MOD-sp2) 70.0 ml

SI(MOD-sp2) 32.4 ml/m\S\2



SV(MOD-bp) 72.0 ml

SI(MOD-bp) 33.3 ml/m\S\2



SV(sp4-el) 79.1 ml

SI(sp4-el) 36.6 ml/m\S\2



SV(sp2-el) 73.4 ml

SI(sp2-el) 34.0 ml/m\S\2







Doppler Measurements and Calculations

MV E max eric 152.3 cm/sec

MV A max eric 110.1 cm/sec



MV E/A 1.4 



MV V2 max 160.2 cm/sec

MV max PG 10.3 mmHg

MV V2 mean 100.3 cm/sec

MV mean PG 4.5 mmHg

MV V2 VTI 53.3 cm



MV P1/2t max eric 161.5 cm/sec

MV P1/2t 82.5 msec

MVA(P1/2t) 2.7 cm\S\2

MV dec slope 573.3 cm/sec\S\2

MV dec time 0.26 sec





Ao V2 max 157.5 cm/sec

Ao max PG 9.9 mmHg

Ao max PG (full) 2.6 mmHg



LV V1 max PG 7.3 mmHg



LV V1 max 135.3 cm/sec



TV E max eric 62.6 cm/sec





PA V2 max 97.7 cm/sec

PA max PG 3.8 mmHg

PA acc slope 745.8 cm/sec\S\2

PA acc time 0.10 sec



PA pr(Accel) 33.1 mmHg

## 2017-09-23 NOTE — DIAGNOSTIC IMAGING REPORT
HEAD CTA



HISTORY:      STROKE



TECHNIQUE: Multiaxial CT images of the head were performed after the intravenous

administration of contrast to evaluate the major cerebral vessels. Maximum

intensity projection images were also obtained.  A dose lowering technique was

utilized adhering to the principles of ALARA. 



COMPARISON:  None.



FINDINGS: There is no mass, hematoma, midline shift, or acute infarct.

Visualized intracranial internal carotid arteries, distal vertebral arteries,

and basilar artery are widely patent. There is no significant stenosis,

occlusion, or aneurysm seen within the bilateral ACAs, MCAs, or PCAs.



IMPRESSION:  

No significant stenosis, occlusion, or aneurysm within the [place]. 







Electronically signed by:  Willis Urrutia M.D.

9/23/2017 5:08 PM



Dictated Date/Time:  9/23/2017 5:06 PM

## 2017-09-24 VITALS
OXYGEN SATURATION: 91 % | DIASTOLIC BLOOD PRESSURE: 81 MMHG | HEART RATE: 80 BPM | TEMPERATURE: 97.7 F | SYSTOLIC BLOOD PRESSURE: 142 MMHG

## 2017-09-24 VITALS — OXYGEN SATURATION: 93 % | SYSTOLIC BLOOD PRESSURE: 130 MMHG | HEART RATE: 76 BPM | DIASTOLIC BLOOD PRESSURE: 77 MMHG

## 2017-09-24 VITALS
OXYGEN SATURATION: 92 % | HEART RATE: 60 BPM | SYSTOLIC BLOOD PRESSURE: 187 MMHG | DIASTOLIC BLOOD PRESSURE: 79 MMHG | TEMPERATURE: 98.06 F

## 2017-09-24 VITALS — DIASTOLIC BLOOD PRESSURE: 76 MMHG | SYSTOLIC BLOOD PRESSURE: 169 MMHG

## 2017-09-24 VITALS
SYSTOLIC BLOOD PRESSURE: 130 MMHG | DIASTOLIC BLOOD PRESSURE: 77 MMHG | HEART RATE: 82 BPM | TEMPERATURE: 97.88 F | OXYGEN SATURATION: 93 %

## 2017-09-24 VITALS
HEART RATE: 64 BPM | SYSTOLIC BLOOD PRESSURE: 159 MMHG | TEMPERATURE: 98.42 F | OXYGEN SATURATION: 95 % | DIASTOLIC BLOOD PRESSURE: 96 MMHG

## 2017-09-24 VITALS
SYSTOLIC BLOOD PRESSURE: 159 MMHG | TEMPERATURE: 97.7 F | HEART RATE: 70 BPM | DIASTOLIC BLOOD PRESSURE: 88 MMHG | OXYGEN SATURATION: 94 %

## 2017-09-24 VITALS
OXYGEN SATURATION: 93 % | HEART RATE: 86 BPM | SYSTOLIC BLOOD PRESSURE: 165 MMHG | TEMPERATURE: 97.7 F | DIASTOLIC BLOOD PRESSURE: 84 MMHG

## 2017-09-24 LAB
ANION GAP SERPL CALC-SCNC: 6 MMOL/L (ref 3–11)
BASOPHILS # BLD: 0.03 K/UL (ref 0–0.2)
BASOPHILS NFR BLD: 0.4 %
BUN SERPL-MCNC: 12 MG/DL (ref 7–18)
BUN/CREAT SERPL: 15.4 (ref 10–20)
CALCIUM SERPL-MCNC: 8.8 MG/DL (ref 8.5–10.1)
CHLORIDE SERPL-SCNC: 101 MMOL/L (ref 98–107)
CO2 SERPL-SCNC: 33 MMOL/L (ref 21–32)
COMPLETE: YES
CREAT CL PREDICTED SERPL C-G-VRATE: 87.1 ML/MIN
CREAT SERPL-MCNC: 0.75 MG/DL (ref 0.6–1.2)
EOSINOPHIL NFR BLD AUTO: 217 K/UL (ref 130–400)
GLUCOSE SERPL-MCNC: 93 MG/DL (ref 70–99)
HCT VFR BLD CALC: 45.5 % (ref 37–47)
IG%: 0.3 %
IMM GRANULOCYTES NFR BLD AUTO: 23.6 %
INR PPP: 1 (ref 0.9–1.1)
LYMPHOCYTES # BLD: 1.7 K/UL (ref 1.2–3.4)
MCH RBC QN AUTO: 29.7 PG (ref 25–34)
MCHC RBC AUTO-ENTMCNC: 32.1 G/DL (ref 32–36)
MCV RBC AUTO: 92.7 FL (ref 80–100)
MONOCYTES NFR BLD: 10.8 %
NEUTROPHILS # BLD AUTO: 2.2 %
NEUTROPHILS NFR BLD AUTO: 62.7 %
PMV BLD AUTO: 10.8 FL (ref 7.4–10.4)
POTASSIUM SERPL-SCNC: 3.3 MMOL/L (ref 3.5–5.1)
PROTHROMBIN TIME: 10.4 SECONDS (ref 9–12)
RBC # BLD AUTO: 4.91 M/UL (ref 4.2–5.4)
SODIUM SERPL-SCNC: 140 MMOL/L (ref 136–145)
WBC # BLD AUTO: 7.19 K/UL (ref 4.8–10.8)

## 2017-09-24 RX ADMIN — POTASSIUM CHLORIDE SCH MEQ: 1500 TABLET, EXTENDED RELEASE ORAL at 08:31

## 2017-09-24 RX ADMIN — HEPARIN SODIUM SCH UNIT: 10000 INJECTION, SOLUTION INTRAVENOUS; SUBCUTANEOUS at 15:03

## 2017-09-24 RX ADMIN — SPIRONOLACTONE SCH MG: 25 TABLET, FILM COATED ORAL at 08:32

## 2017-09-24 RX ADMIN — CEFTRIAXONE SODIUM SCH MLS/HR: 1 INJECTION, POWDER, FOR SOLUTION INTRAVENOUS at 20:56

## 2017-09-24 RX ADMIN — CLINDAMYCIN PHOSPHATE SCH MLS/HR: 150 INJECTION, SOLUTION INTRAMUSCULAR; INTRAVENOUS at 15:33

## 2017-09-24 RX ADMIN — ALBUTEROL SULFATE SCH PUFFS: 90 AEROSOL, METERED RESPIRATORY (INHALATION) at 08:31

## 2017-09-24 RX ADMIN — HEPARIN SODIUM SCH UNIT: 10000 INJECTION, SOLUTION INTRAVENOUS; SUBCUTANEOUS at 20:57

## 2017-09-24 RX ADMIN — CLOPIDOGREL BISULFATE SCH MG: 75 TABLET, FILM COATED ORAL at 08:31

## 2017-09-24 RX ADMIN — VERAPAMIL HYDROCHLORIDE SCH MG: 240 TABLET, FILM COATED, EXTENDED RELEASE ORAL at 08:31

## 2017-09-24 RX ADMIN — CLINDAMYCIN PHOSPHATE SCH MLS/HR: 150 INJECTION, SOLUTION INTRAMUSCULAR; INTRAVENOUS at 23:32

## 2017-09-24 RX ADMIN — ALLOPURINOL SCH MG: 300 TABLET ORAL at 08:31

## 2017-09-24 RX ADMIN — VERAPAMIL HYDROCHLORIDE SCH MG: 240 TABLET, FILM COATED, EXTENDED RELEASE ORAL at 20:56

## 2017-09-24 RX ADMIN — FUROSEMIDE SCH MG: 40 TABLET ORAL at 08:31

## 2017-09-24 RX ADMIN — HEPARIN SODIUM SCH UNIT: 10000 INJECTION, SOLUTION INTRAVENOUS; SUBCUTANEOUS at 05:40

## 2017-09-24 RX ADMIN — CLINDAMYCIN PHOSPHATE SCH MLS/HR: 150 INJECTION, SOLUTION INTRAMUSCULAR; INTRAVENOUS at 08:30

## 2017-09-24 RX ADMIN — NICOTINE SCH PATCH: 21 PATCH, EXTENDED RELEASE TRANSDERMAL at 08:33

## 2017-09-24 RX ADMIN — FUROSEMIDE SCH MG: 40 TABLET ORAL at 15:34

## 2017-09-24 RX ADMIN — TOLTERODINE TARTRATE SCH MG: 2 CAPSULE, EXTENDED RELEASE ORAL at 08:32

## 2017-09-24 NOTE — FAMILY MEDICINE PROGRESS NOTE
Progress Note


Date of Service


Sep 24, 2017.





Subjective


Pt evaluation today including:  conversation w/ patient, physical exam, chart 

review, lab review


Found pt resting in bed comfortably.  Ongoing right-sided weakness and slurred 

speech, says feels unchanged.  Ongoing cough.  Denies CP, SOB, abd pain, 

headache, or other acute concerns.





   Constitutional:  No fever, No chills


   Respiratory:  + cough, No shortness of breath


   Cardiovascular:  No chest pain, No edema


   Abdomen:  No pain, No nausea, No vomiting


   Neurologic:  + weakness





Medications





Current Inpatient Medications








 Medications


  (Trade)  Dose


 Ordered  Sig/Shiv


 Route  Start Time


 Stop Time Status Last Admin


Dose Admin


 


 Miscellaneous


 Information


  (Pharmacist


 Discharge Med Rec


 Consult)  1 ea  UD  PRN


 N/A  9/22/17 20:15


 10/22/17 20:14   


 


 


 Heparin Sodium


  (Porcine)


  (Heparin Sq 5000


 Unit/0.5ml)  5,000 unit  Q8


 SQ  9/22/17 22:00


 10/22/17 21:59  9/24/17 05:40


5,000 UNIT


 


 Acetaminophen


  (Tylenol Tab)  650 mg  Q4H  PRN


 PO  9/22/17 20:30


 10/22/17 20:29   


 


 


 Al Hydrox/Mg


 Hydrox/Simethicone


  (Maalox Max Susp)  15 ml  Q4H  PRN


 PO  9/22/17 20:30


 10/22/17 20:29   


 


 


 Magnesium


 Hydroxide


  (Milk Of


 Magnesia Susp)  30 ml  Q12H  PRN


 PO  9/22/17 20:30


 10/22/17 20:29   


 


 


 Ondansetron HCl


  (Zofran Inj)  4 mg  Q6H  PRN


 IV  9/22/17 20:30


 10/22/17 20:29   


 


 


 Nitroglycerin


  (Nitrostat Tab)  0.4 mg  UD  PRN


 SL  9/22/17 20:30


 10/22/17 20:29   


 


 


 Polyethylene


  (Miralax Powder


 Packet)  17 gm  DAILY  PRN


 PO  9/22/17 20:30


 10/22/17 20:29   


 


 


 Albuterol


  (Ventolin Hfa


 Inhaler)  2 puffs  DAILY


 INH  9/23/17 09:00


 10/23/17 08:59  9/23/17 08:29


2 PUFFS


 


 Allopurinol


  (Zyloprim Tab)  300 mg  QAM


 PO  9/23/17 09:00


 10/23/17 08:59  9/23/17 08:31


300 MG


 


 Furosemide


  (Lasix Tab)  40 mg  BID17


 PO  9/22/17 21:00


 10/22/17 20:59  9/23/17 17:58


40 MG


 


 Nicotine


  (Nicoderm Cq


 21MG Patch)  1 patch  DAILY


 TD  9/23/17 09:00


 10/23/17 08:59  9/23/17 08:31


1 PATCH


 


 Spironolactone


  (Aldactone Tab)  25 mg  DAILY


 PO  9/23/17 09:00


 10/23/17 08:59  9/23/17 08:29


25 MG


 


 Tolterodine


 Tartrate


  (Detrol LA Cap)  2 mg  DAILY


 PO  9/23/17 09:00


 10/23/17 08:59  9/23/17 08:30


2 MG


 


 Verapamil HCl


  (Calan-Sr Tab)  240 mg  BID


 PO  9/22/17 21:00


 10/22/17 20:59  9/23/17 21:37


240 MG


 


 Potassium Chloride


  (Klor-Con Tab)  20 meq  QAM


 PO  9/23/17 09:00


 10/23/17 08:59  9/23/17 08:31


20 MEQ


 


 Ceftriaxone


 Sodium 1 gm/


 Dextrose  50 ml @ 


 100 mls/hr  Q24H


 IV  9/22/17 23:00


 9/29/17 22:59  9/23/17 21:37


100 MLS/HR


 


 Ioversol


  (Optiray 320)  100 ml  UD  PRN


 IV  9/22/17 23:00


 9/26/17 22:59   


 


 


 Clindamycin


 Phosphate 900 mg/


 Dextrose  106 ml @ 


 100 mls/hr  Q8H


 IV  9/23/17 16:00


 9/30/17 15:59  9/23/17 23:24


100 MLS/HR


 


 Clopidogrel


 Bisulfate


  (plAVix TAB)  75 mg  QAM


 PO  9/24/17 09:00


 10/24/17 08:59   


 











Objective


Vital Signs











  Date Time  Temp Pulse Resp B/P (MAP) Pulse Ox O2 Delivery O2 Flow Rate FiO2


 


9/24/17 07:26 36.5 80 18 142/81 (101) 91 Nasal Cannula 2.0 


 


9/24/17 05:30    169/76 (107)    


 


9/24/17 04:00      Nasal Cannula 2.0 


 


9/24/17 03:55 36.7 60 22 187/79 (115) 92 Nasal Cannula 2.0 


 


9/24/17 00:00      Nasal Cannula 2.0 


 


9/23/17 23:29 36.4 81 20 176/89 (118) 93 Nasal Cannula 2.0 


 


9/23/17 20:01      Nasal Cannula 2.0 


 


9/23/17 18:54 36.7 73 20 162/81 (108) 92 Nasal Cannula 2.0 


 


9/23/17 16:00      Nasal Cannula 2.0 


 


9/23/17 15:28 36.8 67 20 178/82 (114) 91 Nasal Cannula 2.0 


 


9/23/17 12:00      Room Air  


 


9/23/17 11:23 36.5 73 20 178/83 (114) 91 Nasal Cannula 2.0 











Physical Exam


General Appearance:  no apparent distress


Respiratory/Chest:  normal breath sounds, no respiratory distress, + wheezing (

mild expiratory)


Cardiovascular:  regular rate, rhythm, no edema, no murmur


Abdomen:  normal bowel sounds, non tender, soft, + hernia (large umbilical 

hernia)


Extremities:  no pedal edema





Laboratory Results


9/24/17 05:16








Red Blood Count 4.91, Mean Corpuscular Volume 92.7, Mean Corpuscular Hemoglobin 

29.7, Mean Corpuscular Hemoglobin Concent 32.1, Mean Platelet Volume 10.8, 

Neutrophils (%) (Auto) 62.7, Lymphocytes (%) (Auto) 23.6, Monocytes (%) (Auto) 

10.8, Eosinophils (%) (Auto) 2.2, Basophils (%) (Auto) 0.4, Neutrophils # (Auto

) 4.50, Lymphocytes # (Auto) 1.70, Monocytes # (Auto) 0.78, Eosinophils # (Auto

) 0.16, Basophils # (Auto) 0.03





9/24/17 05:16

















Test


  9/24/17


05:16


 


White Blood Count


  7.19 K/uL


(4.8-10.8)


 


Red Blood Count


  4.91 M/uL


(4.2-5.4)


 


Hemoglobin


  14.6 g/dL


(12.0-16.0)


 


Hematocrit 45.5 % (37-47) 


 


Mean Corpuscular Volume


  92.7 fL


()


 


Mean Corpuscular Hemoglobin


  29.7 pg


(25-34)


 


Mean Corpuscular Hemoglobin


Concent 32.1 g/dl


(32-36)


 


Platelet Count


  217 K/uL


(130-400)


 


Mean Platelet Volume


  10.8 fL


(7.4-10.4)


 


Neutrophils (%) (Auto) 62.7 % 


 


Lymphocytes (%) (Auto) 23.6 % 


 


Monocytes (%) (Auto) 10.8 % 


 


Eosinophils (%) (Auto) 2.2 % 


 


Basophils (%) (Auto) 0.4 % 


 


Neutrophils # (Auto)


  4.50 K/uL


(1.4-6.5)


 


Lymphocytes # (Auto)


  1.70 K/uL


(1.2-3.4)


 


Monocytes # (Auto)


  0.78 K/uL


(0.11-0.59)


 


Eosinophils # (Auto)


  0.16 K/uL


(0-0.5)


 


Basophils # (Auto)


  0.03 K/uL


(0-0.2)


 


RDW Standard Deviation


  48.2 fL


(36.4-46.3)


 


RDW Coefficient of Variation


  14.2 %


(11.5-14.5)


 


Immature Granulocyte % (Auto) 0.3 % 


 


Immature Granulocyte # (Auto)


  0.02 K/uL


(0.00-0.02)


 


Prothrombin Time


  10.4 SECONDS


(9.0-12.0)


 


Prothromb Time International


Ratio 1.0 (0.9-1.1) 


 


 


Anion Gap


  6.0 mmol/L


(3-11)


 


Est Creatinine Clear Calc


Drug Dose 87.1 ml/min 


 


 


Estimated GFR (


American) 94.9 


 


 


Estimated GFR (Non-


American 81.9 


 


 


BUN/Creatinine Ratio 15.4 (10-20) 


 


Calcium Level


  8.8 mg/dl


(8.5-10.1)











Assessment and Plan


65 yo female c/o right arm and leg weakness noted on Tues 19Sep, ? similar sx 

but less pronounced during prior week.  





Right-sided weakness and slurred speech: Left hemispheric lesion/cervical 

etiology. Not a tPA candidate due to sx duration.  Pt says her slurred speech 

is new as of 19Sep but thinks it's due to a dry mouth, doesn't seem to concern 

her.  Pt agreed to CT head on initial eval but has declined MRA head and neck, 

and MRI brain, stating she cannot remain lying down for that long.  


- Started on plavix.  Pt has allergy to statins.  Stopped home ASA 81.  EKG 

22Sep NSR 88.  


- [id number] CT head noted nothing acute but old lacunar infarct in right basal 

ganglia.  


- 23Sep Neuro consult: Re-recommended CTA head/neck.  Switch ASA to plavix.  F/

u PT/OT/speech for d/c plans.  BP parameter recs.  General stroke risk factor 

modification.  F/u in their clinic in one month.  


- 23Sep CTA head and neck showed no significant arterial stenosis and mild 

focal stenosis within the intracranial portion of the distal right vertebral 

artery.


- 23Sep Echocardiogram noted normal LV size, hyperdynamic systolic function, EF 

65-70%, no regional wall motion abnormalities, mild LVH, left atrium moderately 

dilated.   


- Started neuro checks and aspiration precautions


- 23Sep Speech eval: Regular diet with thin liquids, aspiration precautions, no 

additional speech therapy necessary.  





Pneumonia: Denies known recent illness, fevers, cough, N/V.  On admit afebrile, 

clear lungs, no leukocytosis on labs.  


- 22Sep pCXR one view noted pulmonary vascular congestion and mild superimposed 

infiltrate right base. 


- Question if source is aspiration.  Reportedly pt failed dysphagia screen in 

the ED, then got a moisturized swab of the mouth and passed her dysphagia 

screen on the floor. 


- 23Sep started on clindamycin 900 mg IV q8h and ceftriaxone 1 gram IV daily.  





Hypokalemia: K 3.2 on admit.  Replaced via IV and PO.  





COPD: Reported hx smoking x 50 years, perhaps 1/2 to 1 ppd.  


- On home albuterol.  


- Nicotine 21mg TD patch. 





HTN:  


- On home Verapamil  mg BID, Lasix 40 mg BID, and Spironolactone 25mg 

daily. 


- Elevated on admit, given metoprolol 5 mg IV.  





Urinary incontinence: Continue home toleterodine as inpt.  





Prediabetes: Per report.  Glc so far were 94 & 81.  


[ ] Will check HbA1c.  





Disposition: Pending.  Working with case management for further inpt rehab at 

time of d/c. 


- Uses a motorized scooter to get around at baseline.


- 23Sep OT: Recommended further inpt rehab.


- 24Sep PT: Could consider outpt rehab. 





DVT prophy: Heparin q8h.  


Code status: Full code





JSUZAN, PGY1 Family Medicine





Resident Tracking


Resident Involvement:  Resident Care Provided


Care Provided:  Adult Hospital Medicine (inpt rounds)





Reviewed:  Pt Seen/Exam by Me


History


no new concerns


weakness improved in hand


speech clear. 


concerned about her  - he got admitted last night


Constitutional:  denies: fever


Respiratory:  negative: short of breath


Cardiovascular:  denies chest pain


Gastrointestinal/Abdominal:  negative: abdominal pain


General Appearance:  no apparent distress


Respiratory:  lungs clear, no respiratory distress


Cardiovascular:  regular rate, rhythm


Gastrointestinal:  normal bowel sounds, non tender, soft


Neurologic/Psychiatric:  alert, oriented x 3, other (right hand strength 4/5)


Skin Characteristics:  warm/dry


Assessment/Plan


Resident Physician Supervision Note:


I independently interviewed and examined the patient and verified the key 

history and physical, reviewed labs and image studies, discussed the case with 

the resident Dr. Okeefe and agree with the findings and care plan.

## 2017-09-25 VITALS
SYSTOLIC BLOOD PRESSURE: 148 MMHG | DIASTOLIC BLOOD PRESSURE: 75 MMHG | TEMPERATURE: 97.52 F | HEART RATE: 74 BPM | OXYGEN SATURATION: 92 %

## 2017-09-25 VITALS
SYSTOLIC BLOOD PRESSURE: 132 MMHG | HEART RATE: 76 BPM | TEMPERATURE: 98.24 F | DIASTOLIC BLOOD PRESSURE: 71 MMHG | OXYGEN SATURATION: 93 %

## 2017-09-25 VITALS
TEMPERATURE: 98.42 F | HEART RATE: 76 BPM | SYSTOLIC BLOOD PRESSURE: 150 MMHG | OXYGEN SATURATION: 95 % | DIASTOLIC BLOOD PRESSURE: 84 MMHG

## 2017-09-25 VITALS
DIASTOLIC BLOOD PRESSURE: 84 MMHG | OXYGEN SATURATION: 95 % | HEART RATE: 76 BPM | SYSTOLIC BLOOD PRESSURE: 150 MMHG | TEMPERATURE: 98.42 F

## 2017-09-25 VITALS
DIASTOLIC BLOOD PRESSURE: 80 MMHG | OXYGEN SATURATION: 91 % | SYSTOLIC BLOOD PRESSURE: 154 MMHG | TEMPERATURE: 98.42 F | HEART RATE: 80 BPM

## 2017-09-25 VITALS — OXYGEN SATURATION: 93 %

## 2017-09-25 VITALS
HEART RATE: 66 BPM | TEMPERATURE: 97.88 F | SYSTOLIC BLOOD PRESSURE: 168 MMHG | OXYGEN SATURATION: 91 % | DIASTOLIC BLOOD PRESSURE: 86 MMHG

## 2017-09-25 LAB
ANION GAP SERPL CALC-SCNC: 8 MMOL/L (ref 3–11)
BASOPHILS # BLD: 0.05 K/UL (ref 0–0.2)
BASOPHILS NFR BLD: 0.9 %
BUN SERPL-MCNC: 13 MG/DL (ref 7–18)
BUN/CREAT SERPL: 17.6 (ref 10–20)
CALCIUM SERPL-MCNC: 9.5 MG/DL (ref 8.5–10.1)
CHLORIDE SERPL-SCNC: 101 MMOL/L (ref 98–107)
CO2 SERPL-SCNC: 31 MMOL/L (ref 21–32)
COMPLETE: YES
CREAT CL PREDICTED SERPL C-G-VRATE: 86.1 ML/MIN
CREAT SERPL-MCNC: 0.75 MG/DL (ref 0.6–1.2)
EOSINOPHIL NFR BLD AUTO: 187 K/UL (ref 130–400)
EST. AVERAGE GLUCOSE BLD GHB EST-MCNC: 111 MG/DL
GLUCOSE SERPL-MCNC: 93 MG/DL (ref 70–99)
HCT VFR BLD CALC: 42.8 % (ref 37–47)
IG%: 0.3 %
IMM GRANULOCYTES NFR BLD AUTO: 32.2 %
INR PPP: 1 (ref 0.9–1.1)
LYMPHOCYTES # BLD: 1.87 K/UL (ref 1.2–3.4)
MCH RBC QN AUTO: 30.4 PG (ref 25–34)
MCHC RBC AUTO-ENTMCNC: 33.2 G/DL (ref 32–36)
MCV RBC AUTO: 91.6 FL (ref 80–100)
MONOCYTES NFR BLD: 11.5 %
NEUTROPHILS # BLD AUTO: 3.3 %
NEUTROPHILS NFR BLD AUTO: 51.8 %
PMV BLD AUTO: 11 FL (ref 7.4–10.4)
POTASSIUM SERPL-SCNC: 3.4 MMOL/L (ref 3.5–5.1)
PROTHROMBIN TIME: 10.2 SECONDS (ref 9–12)
RBC # BLD AUTO: 4.67 M/UL (ref 4.2–5.4)
SODIUM SERPL-SCNC: 140 MMOL/L (ref 136–145)
WBC # BLD AUTO: 5.81 K/UL (ref 4.8–10.8)

## 2017-09-25 RX ADMIN — VERAPAMIL HYDROCHLORIDE SCH MG: 240 TABLET, FILM COATED, EXTENDED RELEASE ORAL at 07:54

## 2017-09-25 RX ADMIN — SPIRONOLACTONE SCH MG: 25 TABLET, FILM COATED ORAL at 07:55

## 2017-09-25 RX ADMIN — FUROSEMIDE SCH MG: 40 TABLET ORAL at 16:52

## 2017-09-25 RX ADMIN — NICOTINE SCH PATCH: 21 PATCH, EXTENDED RELEASE TRANSDERMAL at 07:55

## 2017-09-25 RX ADMIN — ALLOPURINOL SCH MG: 300 TABLET ORAL at 07:54

## 2017-09-25 RX ADMIN — VERAPAMIL HYDROCHLORIDE SCH MG: 240 TABLET, FILM COATED, EXTENDED RELEASE ORAL at 19:59

## 2017-09-25 RX ADMIN — POTASSIUM CHLORIDE SCH MEQ: 1500 TABLET, EXTENDED RELEASE ORAL at 07:54

## 2017-09-25 RX ADMIN — HEPARIN SODIUM SCH UNIT: 10000 INJECTION, SOLUTION INTRAVENOUS; SUBCUTANEOUS at 06:09

## 2017-09-25 RX ADMIN — HEPARIN SODIUM SCH UNIT: 10000 INJECTION, SOLUTION INTRAVENOUS; SUBCUTANEOUS at 20:04

## 2017-09-25 RX ADMIN — CLINDAMYCIN HYDROCHLORIDE SCH MG: 150 CAPSULE ORAL at 12:21

## 2017-09-25 RX ADMIN — TOLTERODINE TARTRATE SCH MG: 2 CAPSULE, EXTENDED RELEASE ORAL at 07:55

## 2017-09-25 RX ADMIN — FUROSEMIDE SCH MG: 40 TABLET ORAL at 07:54

## 2017-09-25 RX ADMIN — CLINDAMYCIN PHOSPHATE SCH MLS/HR: 150 INJECTION, SOLUTION INTRAMUSCULAR; INTRAVENOUS at 07:53

## 2017-09-25 RX ADMIN — CLINDAMYCIN HYDROCHLORIDE SCH MG: 150 CAPSULE ORAL at 16:52

## 2017-09-25 RX ADMIN — ALBUTEROL SULFATE SCH PUFFS: 90 AEROSOL, METERED RESPIRATORY (INHALATION) at 07:54

## 2017-09-25 RX ADMIN — HEPARIN SODIUM SCH UNIT: 10000 INJECTION, SOLUTION INTRAVENOUS; SUBCUTANEOUS at 13:11

## 2017-09-25 RX ADMIN — CLOPIDOGREL BISULFATE SCH MG: 75 TABLET, FILM COATED ORAL at 07:54

## 2017-09-25 NOTE — CLINICAL DOCUMENTATION QUERY
**** CLINICAL DOCUMENTATION QUERY****



H&P stated CVA, but since that time its' documentation has fallen off the record. Has 
stroke been ruled out?



In your clinical opinion is this patient being managed for:

    

                        (  ) Suspected stroke 

                        (  ) Stroke ruled out



Please clarify and document your clinical opinion in the progress notes and discharge 
summary. Terms such as "probable", "suspected", "likely", "questionable", "possible", or 
"still to be ruled out" are acceptable. 



*****IF IN AGREEMENT, YOU MUST DOCUMENT ABOVE DIAGNOSTIC STATEMENT IN DAILY PROGRESS NOTES 
AND DISCHARGE SUMMARY. This document is not part of the patient's record.*****



Thank You, Darrin Schultz, RN  [phone]

## 2017-09-25 NOTE — FAMILY MEDICINE PROGRESS NOTE
Progress Note


Date of Service


Sep 25, 2017.





Subjective


Pt evaluation today including:  conversation w/ patient, physical exam, chart 

review, lab review, review of inpatient medication list


Pain:  none


Voiding:  no voiding problems


Patient eating breakfast at bedside chair this AM, no complaints, feels she is 

getting stronger but still notices weakness discrepancy from right vs. left. 

Eager to go to rehab.





   Constitutional:  + weakness, No fever, No chills, No sweats


   Eyes:  No worsening of vision, No diplopia


   ENT:  No sore throat, No dental problems, No trouble swallowing


   Respiratory:  + dyspnea on exertion, No cough, No sputum, No wheezing, No 

shortness of breath


   Cardiovascular:  No chest pain, No edema, No claudication


   Abdomen:  No pain, No nausea, No vomiting, No diarrhea, No constipation, No 

GI bleeding


   Female :  No dysuria


   Neurologic:  + weakness


   Skin:  No rash, No new/changing skin lesions





Medications





Current Inpatient Medications








 Medications


  (Trade)  Dose


 Ordered  Sig/Shiv


 Route  Start Time


 Stop Time Status Last Admin


Dose Admin


 


 Miscellaneous


 Information


  (Pharmacist


 Discharge Med Rec


 Consult)  1 ea  UD  PRN


 N/A  9/22/17 20:15


 10/22/17 20:14   


 


 


 Heparin Sodium


  (Porcine)


  (Heparin Sq 5000


 Unit/0.5ml)  5,000 unit  Q8


 SQ  9/22/17 22:00


 10/22/17 21:59  9/25/17 13:11


5,000 UNIT


 


 Acetaminophen


  (Tylenol Tab)  650 mg  Q4H  PRN


 PO  9/22/17 20:30


 10/22/17 20:29   


 


 


 Al Hydrox/Mg


 Hydrox/Simethicone


  (Maalox Max Susp)  15 ml  Q4H  PRN


 PO  9/22/17 20:30


 10/22/17 20:29   


 


 


 Magnesium


 Hydroxide


  (Milk Of


 Magnesia Susp)  30 ml  Q12H  PRN


 PO  9/22/17 20:30


 10/22/17 20:29   


 


 


 Ondansetron HCl


  (Zofran Inj)  4 mg  Q6H  PRN


 IV  9/22/17 20:30


 10/22/17 20:29   


 


 


 Nitroglycerin


  (Nitrostat Tab)  0.4 mg  UD  PRN


 SL  9/22/17 20:30


 10/22/17 20:29   


 


 


 Polyethylene


  (Miralax Powder


 Packet)  17 gm  DAILY  PRN


 PO  9/22/17 20:30


 10/22/17 20:29   


 


 


 Albuterol


  (Ventolin Hfa


 Inhaler)  2 puffs  DAILY


 INH  9/23/17 09:00


 10/23/17 08:59  9/25/17 07:54


2 PUFFS


 


 Allopurinol


  (Zyloprim Tab)  300 mg  QAM


 PO  9/23/17 09:00


 10/23/17 08:59  9/25/17 07:54


300 MG


 


 Furosemide


  (Lasix Tab)  40 mg  BID17


 PO  9/22/17 21:00


 10/22/17 20:59  9/25/17 16:52


40 MG


 


 Nicotine


  (Nicoderm Cq


 21MG Patch)  1 patch  DAILY


 TD  9/23/17 09:00


 10/23/17 08:59  9/25/17 07:55


1 PATCH


 


 Spironolactone


  (Aldactone Tab)  25 mg  DAILY


 PO  9/23/17 09:00


 10/23/17 08:59  9/25/17 07:55


25 MG


 


 Tolterodine


 Tartrate


  (Detrol LA Cap)  2 mg  DAILY


 PO  9/23/17 09:00


 10/23/17 08:59  9/25/17 07:55


2 MG


 


 Verapamil HCl


  (Calan-Sr Tab)  240 mg  BID


 PO  9/22/17 21:00


 10/22/17 20:59  9/25/17 07:54


240 MG


 


 Potassium Chloride


  (Klor-Con Tab)  20 meq  QAM


 PO  9/23/17 09:00


 10/23/17 08:59  9/25/17 07:54


20 MEQ


 


 Ioversol


  (Optiray 320)  100 ml  UD  PRN


 IV  9/22/17 23:00


 9/26/17 22:59   


 


 


 Clopidogrel


 Bisulfate


  (plAVix TAB)  75 mg  QAM


 PO  9/24/17 09:00


 10/24/17 08:59  9/25/17 07:54


75 MG


 


 Clindamycin HCl


  (Cleocin Cap)  300 mg  Q6


 PO  9/25/17 12:00


 9/30/17 23:59  9/25/17 16:52


300 MG











Objective


Vital Signs











  Date Time  Temp Pulse Resp B/P (MAP) Pulse Ox O2 Delivery O2 Flow Rate FiO2


 


9/25/17 14:02     93 Room Air  


 


9/25/17 13:47 36.8 76 20 132/71 (91) 93 Nasal Cannula 2.0 


 


9/25/17 13:24 36.9 76 20  95  2.0 


 


9/25/17 12:00      Nasal Cannula 2.0 


 


9/25/17 11:16 36.9 76 20 150/84 (106) 95  2.0 


 


9/25/17 08:00      Nasal Cannula 2.0 


 


9/25/17 07:24 36.6 66 20 168/86 (113) 91 Room Air  


 


9/25/17 04:03      Nasal Cannula 2.0 


 


9/25/17 03:03 36.4 74 17 148/75 (99) 92 Nasal Cannula 2.0 


 


9/25/17 00:00      Nasal Cannula 2.0 


 


9/24/17 23:31 36.5 86 18 165/84 (111) 93 Nasal Cannula 2.0 


 


9/24/17 20:03      Nasal Cannula 2.0 


 


9/24/17 19:42 36.9 64 18 159/96 (117) 95 Nasal Cannula 2.0 











Physical Exam


General Appearance:  WD/WN, no apparent distress, + obese


Eyes:  normal inspection, PERRL, EOMI


ENT:  hearing grossly normal, pharynx normal


Neck:  no JVD


Respiratory/Chest:  lungs clear, normal breath sounds, no respiratory distress, 

no accessory muscle use


Cardiovascular:  regular rate, rhythm, no edema, no gallop, no murmur


Abdomen:  normal bowel sounds, non tender, soft


Extremities:  normal range of motion, no pedal edema, no calf tenderness


Neurologic/Psychiatric:  alert, normal mood/affect, oriented x 3, + pertinent 

finding (Right side weakness of 4-5 upper extremety vs left side 5/5)


Skin:  normal color, warm/dry





Laboratory Results


9/25/17 06:00








Red Blood Count 4.67, Mean Corpuscular Volume 91.6, Mean Corpuscular Hemoglobin 

30.4, Mean Corpuscular Hemoglobin Concent 33.2, Mean Platelet Volume 11.0, 

Neutrophils (%) (Auto) 51.8, Lymphocytes (%) (Auto) 32.2, Monocytes (%) (Auto) 

11.5, Eosinophils (%) (Auto) 3.3, Basophils (%) (Auto) 0.9, Neutrophils # (Auto

) 3.01, Lymphocytes # (Auto) 1.87, Monocytes # (Auto) 0.67, Eosinophils # (Auto

) 0.19, Basophils # (Auto) 0.05





9/25/17 06:00

















Test


  9/25/17


06:00


 


White Blood Count


  5.81 K/uL


(4.8-10.8)


 


Red Blood Count


  4.67 M/uL


(4.2-5.4)


 


Hemoglobin


  14.2 g/dL


(12.0-16.0)


 


Hematocrit 42.8 % (37-47) 


 


Mean Corpuscular Volume


  91.6 fL


()


 


Mean Corpuscular Hemoglobin


  30.4 pg


(25-34)


 


Mean Corpuscular Hemoglobin


Concent 33.2 g/dl


(32-36)


 


Platelet Count


  187 K/uL


(130-400)


 


Mean Platelet Volume


  11.0 fL


(7.4-10.4)


 


Neutrophils (%) (Auto) 51.8 % 


 


Lymphocytes (%) (Auto) 32.2 % 


 


Monocytes (%) (Auto) 11.5 % 


 


Eosinophils (%) (Auto) 3.3 % 


 


Basophils (%) (Auto) 0.9 % 


 


Neutrophils # (Auto)


  3.01 K/uL


(1.4-6.5)


 


Lymphocytes # (Auto)


  1.87 K/uL


(1.2-3.4)


 


Monocytes # (Auto)


  0.67 K/uL


(0.11-0.59)


 


Eosinophils # (Auto)


  0.19 K/uL


(0-0.5)


 


Basophils # (Auto)


  0.05 K/uL


(0-0.2)


 


RDW Standard Deviation


  47.4 fL


(36.4-46.3)


 


RDW Coefficient of Variation


  14.1 %


(11.5-14.5)


 


Immature Granulocyte % (Auto) 0.3 % 


 


Immature Granulocyte # (Auto)


  0.02 K/uL


(0.00-0.02)


 


Prothrombin Time


  10.2 SECONDS


(9.0-12.0)


 


Prothromb Time International


Ratio 1.0 (0.9-1.1) 


 


 


Anion Gap


  8.0 mmol/L


(3-11)


 


Est Creatinine Clear Calc


Drug Dose 86.1 ml/min 


 


 


Estimated GFR (


American) 94.9 


 


 


Estimated GFR (Non-


American 81.9 


 


 


BUN/Creatinine Ratio 17.6 (10-20) 


 


Calcium Level


  9.5 mg/dl


(8.5-10.1)


 


Magnesium Level


  2.4 mg/dl


(1.8-2.4)











Assessment and Plan


65 yo female c/o right arm and leg weakness noted on Tues 19Sep, ? similar sx 

but less pronounced during prior week.  





Right-sided weakness and slurred speech: Left hemispheric lesion/cervical 

etiology. Not a tPA candidate due to sx duration.  Pt says her slurred speech 

is new as of 19Sep but thinks it's due to a dry mouth, doesn't seem to concern 

her.  Pt agreed to CT head on initial eval but has declined MRA head and neck, 

and MRI brain, stating she cannot remain lying down for that long.  


- Started on plavix.  


- Pt has allergy to statins.  Stopped home ASA 81.  EKG 22Sep NSR 88.  


- [id number] CT head noted nothing acute but old lacunar infarct in right basal 

ganglia.  


- 23Sep Neuro consult: Re-recommended CTA head/neck.  Switch ASA to plavix.  F/

u PT/OT/speech for d/c plans.  BP parameter recs.  General stroke risk factor 

modification.  F/u in their clinic in one month.  


- 23Sep CTA head and neck showed no significant arterial stenosis and mild 

focal stenosis within the intracranial portion of the distal right vertebral 

artery.


- 23Sep Echocardiogram noted normal LV size, hyperdynamic systolic function, EF 

65-70%, no regional wall motion abnormalities, mild LVH, left atrium moderately 

dilated.   


- Started neuro checks and aspiration precautions


- 23Sep Speech eval: Regular diet with thin liquids, aspiration precautions, no 

additional speech therapy necessary.  





Pneumonia: Denies known recent illness, fevers, cough, N/V.  On admit afebrile, 

clear lungs, no leukocytosis on labs.  


- 22Sep pCXR one view noted pulmonary vascular congestion and mild superimposed 

infiltrate right base. 


- Question if source is aspiration.  Reportedly pt failed dysphagia screen in 

the ED, then got a moisturized swab of the mouth and passed her dysphagia 

screen on the floor. 


- 23Sep started on clindamycin 900 mg IV q8h; converted to oral today: 300 qid 

PO; DCd ceftriaxone.


- wean off of oxygen (88%)


- Two step and transfer to med/surg


- possible DC tomorrow if she can tolerate RA





Hypokalemia: K 3.2 on admit.  Replaced via IV and PO.  


- 3.4 today; replenished with 40 mEq KCl





COPD: Reported hx smoking x 50 years, perhaps 1/2 to 1 ppd.  


- On home albuterol.  


- Nicotine 21mg TD patch. 





HTN:  


- On home Verapamil  mg BID, Lasix 40 mg BID, and Spironolactone 25mg 

daily. 


- Elevated on admit, given metoprolol 5 mg IV.  


- Per neuro: goal for inpatient of 150-175 systolic





Urinary incontinence: Continue home toleterodine as inpt.  





Prediabetes: Per report.  Glc so far were 94 & 81.  


-HbA1c 5.5%, compared to 5.4 % in july 2017 and one level of 7.8 as outpatient, 

date unknown.


-patient reports she had tried metformin but did not like GI side effects; 

would like to try management with diet alone


- try diet for 6 months as outpatient but then recommend follow up as 

outpatient with possible medication start. 





Disposition: Pending.  Working with case management for further inpt rehab at 

time of d/c. 


- Uses a motorized scooter to get around at baseline.


- 23Sep OT: Recommended further inpt rehab.


- 24Sep PT: Could consider outpt rehab. 





DVT prophy: Heparin q8h.  


Code status: Full code


Dispo: telemetry, DC soon.





Resident Tracking


Resident Involvement:  Resident Care Provided


Care Provided:  Adult Hospital Medicine

## 2017-09-26 VITALS
TEMPERATURE: 97.52 F | HEART RATE: 72 BPM | DIASTOLIC BLOOD PRESSURE: 85 MMHG | OXYGEN SATURATION: 95 % | SYSTOLIC BLOOD PRESSURE: 157 MMHG

## 2017-09-26 VITALS
DIASTOLIC BLOOD PRESSURE: 80 MMHG | OXYGEN SATURATION: 97 % | TEMPERATURE: 97.7 F | HEART RATE: 71 BPM | SYSTOLIC BLOOD PRESSURE: 143 MMHG

## 2017-09-26 VITALS
TEMPERATURE: 97.88 F | SYSTOLIC BLOOD PRESSURE: 178 MMHG | DIASTOLIC BLOOD PRESSURE: 84 MMHG | HEART RATE: 75 BPM | OXYGEN SATURATION: 97 %

## 2017-09-26 RX ADMIN — CLINDAMYCIN HYDROCHLORIDE SCH MG: 150 CAPSULE ORAL at 05:52

## 2017-09-26 RX ADMIN — FUROSEMIDE SCH MG: 40 TABLET ORAL at 10:31

## 2017-09-26 RX ADMIN — ALBUTEROL SULFATE SCH PUFFS: 90 AEROSOL, METERED RESPIRATORY (INHALATION) at 10:35

## 2017-09-26 RX ADMIN — CLINDAMYCIN HYDROCHLORIDE SCH MG: 150 CAPSULE ORAL at 12:09

## 2017-09-26 RX ADMIN — SPIRONOLACTONE SCH MG: 25 TABLET, FILM COATED ORAL at 09:00

## 2017-09-26 RX ADMIN — VERAPAMIL HYDROCHLORIDE SCH MG: 240 TABLET, FILM COATED, EXTENDED RELEASE ORAL at 10:31

## 2017-09-26 RX ADMIN — CLOPIDOGREL BISULFATE SCH MG: 75 TABLET, FILM COATED ORAL at 10:31

## 2017-09-26 RX ADMIN — HEPARIN SODIUM SCH UNIT: 10000 INJECTION, SOLUTION INTRAVENOUS; SUBCUTANEOUS at 22:27

## 2017-09-26 RX ADMIN — HEPARIN SODIUM SCH UNIT: 10000 INJECTION, SOLUTION INTRAVENOUS; SUBCUTANEOUS at 05:45

## 2017-09-26 RX ADMIN — CLINDAMYCIN HYDROCHLORIDE SCH MG: 150 CAPSULE ORAL at 00:05

## 2017-09-26 RX ADMIN — TOLTERODINE TARTRATE SCH MG: 2 CAPSULE, EXTENDED RELEASE ORAL at 10:30

## 2017-09-26 RX ADMIN — ALLOPURINOL SCH MG: 300 TABLET ORAL at 10:31

## 2017-09-26 RX ADMIN — VERAPAMIL HYDROCHLORIDE SCH MG: 240 TABLET, FILM COATED, EXTENDED RELEASE ORAL at 20:41

## 2017-09-26 RX ADMIN — FUROSEMIDE SCH MG: 40 TABLET ORAL at 18:19

## 2017-09-26 RX ADMIN — NICOTINE SCH PATCH: 21 PATCH, EXTENDED RELEASE TRANSDERMAL at 10:34

## 2017-09-26 RX ADMIN — POTASSIUM CHLORIDE SCH MEQ: 1500 TABLET, EXTENDED RELEASE ORAL at 10:31

## 2017-09-26 RX ADMIN — CLINDAMYCIN HYDROCHLORIDE SCH MG: 150 CAPSULE ORAL at 18:19

## 2017-09-26 RX ADMIN — HEPARIN SODIUM SCH UNIT: 10000 INJECTION, SOLUTION INTRAVENOUS; SUBCUTANEOUS at 14:51

## 2017-09-26 NOTE — CLINICAL DOCUMENTATION QUERY
**** CLINICAL DOCUMENTATION QUERY****



H&P stated CVA, but since that time its' documentation has fallen off the record. Has 
stroke been ruled out? Currently left hemispheric lesion/cervical etiology are being 
documented. 



In your clinical opinion is this patient being managed for:

    

                        ( x ) Suspected left hemisphere stroke vs. cervical stenosis

                        (  ) Stroke ruled out



Please clarify and document your clinical opinion in the progress notes and discharge 
summary. Terms such as "probable", "suspected", "likely", "questionable", "possible", or 
"still to be ruled out" are acceptable. 



*****IF IN AGREEMENT, YOU MUST DOCUMENT ABOVE DIAGNOSTIC STATEMENT IN DAILY PROGRESS NOTES 
AND DISCHARGE SUMMARY. This document is not part of the patient's record.*****



Thank You, Darrin Schultz, HELDER  [phone]

## 2017-09-26 NOTE — FAMILY MEDICINE PROGRESS NOTE
Progress Note


Date of Service


Sep 26, 2017.





Subjective


Pt evaluation today including:  conversation w/ patient, conversation w/ family

, physical exam, chart review, lab review, review of inpatient medication list


Pain:  None


PO Intake:  Tolerating well


Voiding:  no voiding problems


Patient sitting at bedside, just finished breakfast, no complaints today. 

Reports she has been able to use a fork steadily with her right hand and is 

happy with her strength improvement





   Constitutional:  No fever, No chills, No sweats, No fatigue


   Eyes:  No worsening of vision, No eye pain, No diplopia


   ENT:  No hearing loss, No sore throat, No tinnitus, No trouble swallowing


   Respiratory:  + cough, + sputum, + dyspnea on exertion, No hemoptysis


   Cardiovascular:  No chest pain, No edema


   Abdomen:  No pain, No nausea, No diarrhea, No constipation


   Female :  + incontinence


   Neurologic:  No weakness, No numbness/tingling, No vertigo


   Psychiatric:  No depression symptoms


   Heme:  No abnormal bleeding/bruising





Medications





Current Inpatient Medications








 Medications


  (Trade)  Dose


 Ordered  Sig/Shiv


 Route  Start Time


 Stop Time Status Last Admin


Dose Admin


 


 Miscellaneous


 Information


  (Pharmacist


 Discharge Med Rec


 Consult)  1 ea  UD  PRN


 N/A  9/22/17 20:15


 10/22/17 20:14   


 


 


 Heparin Sodium


  (Porcine)


  (Heparin Sq 5000


 Unit/0.5ml)  5,000 unit  Q8


 SQ  9/22/17 22:00


 10/22/17 21:59  9/26/17 14:51


5,000 UNIT


 


 Acetaminophen


  (Tylenol Tab)  650 mg  Q4H  PRN


 PO  9/22/17 20:30


 10/22/17 20:29  9/25/17 20:08


650 MG


 


 Al Hydrox/Mg


 Hydrox/Simethicone


  (Maalox Max Susp)  15 ml  Q4H  PRN


 PO  9/22/17 20:30


 10/22/17 20:29   


 


 


 Magnesium


 Hydroxide


  (Milk Of


 Magnesia Susp)  30 ml  Q12H  PRN


 PO  9/22/17 20:30


 10/22/17 20:29   


 


 


 Ondansetron HCl


  (Zofran Inj)  4 mg  Q6H  PRN


 IV  9/22/17 20:30


 10/22/17 20:29   


 


 


 Nitroglycerin


  (Nitrostat Tab)  0.4 mg  UD  PRN


 SL  9/22/17 20:30


 10/22/17 20:29   


 


 


 Polyethylene


  (Miralax Powder


 Packet)  17 gm  DAILY  PRN


 PO  9/22/17 20:30


 10/22/17 20:29   


 


 


 Albuterol


  (Ventolin Hfa


 Inhaler)  2 puffs  DAILY


 INH  9/23/17 09:00


 10/23/17 08:59  9/26/17 10:35


2 PUFFS


 


 Allopurinol


  (Zyloprim Tab)  300 mg  QAM


 PO  9/23/17 09:00


 10/23/17 08:59  9/26/17 10:31


300 MG


 


 Furosemide


  (Lasix Tab)  40 mg  BID17


 PO  9/22/17 21:00


 10/22/17 20:59  9/26/17 10:31


40 MG


 


 Nicotine


  (Nicoderm Cq


 21MG Patch)  1 patch  DAILY


 TD  9/23/17 09:00


 10/23/17 08:59  9/26/17 10:34


1 PATCH


 


 Spironolactone


  (Aldactone Tab)  25 mg  DAILY


 PO  9/23/17 09:00


 10/23/17 08:59  9/25/17 07:55


25 MG


 


 Tolterodine


 Tartrate


  (Detrol LA Cap)  2 mg  DAILY


 PO  9/23/17 09:00


 10/23/17 08:59  9/26/17 10:30


2 MG


 


 Verapamil HCl


  (Calan-Sr Tab)  240 mg  BID


 PO  9/22/17 21:00


 10/22/17 20:59  9/26/17 10:31


240 MG


 


 Potassium Chloride


  (Klor-Con Tab)  20 meq  QAM


 PO  9/23/17 09:00


 10/23/17 08:59  9/26/17 10:31


20 MEQ


 


 Ioversol


  (Optiray 320)  100 ml  UD  PRN


 IV  9/22/17 23:00


 9/26/17 22:59   


 


 


 Clopidogrel


 Bisulfate


  (plAVix TAB)  75 mg  QAM


 PO  9/24/17 09:00


 10/24/17 08:59  9/26/17 10:31


75 MG


 


 Clindamycin HCl


  (Cleocin Cap)  300 mg  Q6


 PO  9/25/17 12:00


 9/30/17 23:59  9/26/17 12:09


300 MG











Objective


Vital Signs











  Date Time  Temp Pulse Resp B/P (MAP) Pulse Ox O2 Delivery O2 Flow Rate FiO2


 


9/26/17 15:26 36.5 71 18 143/80 (101) 97  2.0 


 


9/26/17 08:02 36.6 75 18 178/84 (115) 97  2.0 


 


9/26/17 08:00      Room Air  


 


9/25/17 23:45 36.9 80 16 154/80 (104) 91 Room Air  


 


9/25/17 21:00      Room Air  











Physical Exam


General Appearance:  WD/WN, no apparent distress, + obese


Eyes:  normal inspection, PERRL, EOMI


ENT:  hearing grossly normal, pharynx normal


Neck:  supple, no JVD, trachea midline


Respiratory/Chest:  chest non-tender, lungs clear, normal breath sounds, no 

respiratory distress, no accessory muscle use


Cardiovascular:  regular rate, rhythm, no edema, no JVD, no murmur


Abdomen:  normal bowel sounds, non tender, soft


Extremities:  no pedal edema, no calf tenderness


Neurologic/Psychiatric:  no motor/sensory deficits (strength in right sided 

limbs 5-/5), alert, normal mood/affect, oriented x 3


Skin:  normal color, warm/dry





Laboratory Results


Last Resulted


9/25/17 06:00








Red Blood Count 4.67, Mean Corpuscular Volume 91.6, Mean Corpuscular Hemoglobin 

30.4, Mean Corpuscular Hemoglobin Concent 33.2, Mean Platelet Volume 11.0, 

Neutrophils (%) (Auto) 51.8, Lymphocytes (%) (Auto) 32.2, Monocytes (%) (Auto) 

11.5, Eosinophils (%) (Auto) 3.3, Basophils (%) (Auto) 0.9, Neutrophils # (Auto

) 3.01, Lymphocytes # (Auto) 1.87, Monocytes # (Auto) 0.67, Eosinophils # (Auto

) 0.19, Basophils # (Auto) 0.05





Last Resulted


9/25/17 06:00











Assessment and Plan


65 yo female c/o right arm and leg weakness noted on Tues 19Sep, ? similar sx 

but less pronounced during prior week.  





?TIA/Rx for CVA vs Cervical Stenosis/Right-sided weakness and slurred speech: 

Left hemispheric lesion/cervical etiology. Not a tPA candidate due to sx 

duration.  


- Pt declines MRA head and neck, and MRI brain, stating she cannot remain lying 

down for that long.  CT: old lacunar infarct in right basal ganglia.  


- Started on plavix (switched from ASA)


- Pt has allergy to statins.  


- Stopped home ASA 81.  


- Per Neuro: F/u PT/OT for d/c plans.  BP parameter recs.  General stroke risk 

factor modification.  F/u in their clinic in one month.  


- CTA head and neck showed no significant arterial stenosis and mild focal 

stenosis within the intracranial portion of the distal right vertebral artery.  


- Pt has been accepted at [facility], awaiting bed  





Pneumonia: Denies known recent illness, fevers, cough, N/V.  Afebrile, clear 

lungs, no leukocytosis on labs.  


- 22Sep pCXR one view noted pulmonary vascular congestion and mild superimposed 

infiltrate right base. 


- Question if source is aspiration. 


- Day 4 (of 7) of Clindamycin 300 qid PO


- Repeat CXR in 2-3 days at [facility]


- wean off of oxygen (88%)


- Two step not required prior to dc 


- possible DC tomorrow if bed available at [facility]; pt has been accepted.





Hypokalemia:   


- 3.4 Monday; replenished with 40 mEq KCl


- Recheck BMP Wednesday prior to DC





COPD: Reported hx smoking x 50 years, perhaps 1/2 to 1 ppd.  


- On home albuterol.  


- Nicotine 21mg TD patch. 


- Patient reports doing well off cigarettes, recognizes she needs to get away 

from addiction





HTN:  


- On home Verapamil  mg BID, Lasix 40 mg BID, and Spironolactone 25mg 

daily. 


- Elevated on admit, given metoprolol 5 mg IV.  


- Per neuro: goal for inpatient of 150-175 systolic





Urinary incontinence: Continue home tolterodine as inpt.  





Prediabetes: Per report.  Glc so far were 94 & 81.  


-HbA1c 5.5%, compared to 5.4 % in july 2017 and one level of 7.8 as outpatient, 

date unknown.


-patient reports she had tried metformin but did not like GI side effects; 

would like to try management with diet alone


-Continue diet and recommend follow up as outpatient 





Disposition: [facility] as soon as bed available


- Uses a motorized scooter to get around at baseline.


DVT prophylaxis: Heparin


Code: full code





Resident Tracking


Resident Involvement:  Resident Care Provided


Care Provided:  Adult Hospital Medicine

## 2017-09-27 VITALS
HEART RATE: 63 BPM | DIASTOLIC BLOOD PRESSURE: 84 MMHG | OXYGEN SATURATION: 97 % | SYSTOLIC BLOOD PRESSURE: 173 MMHG | TEMPERATURE: 97.7 F

## 2017-09-27 VITALS
HEART RATE: 72 BPM | DIASTOLIC BLOOD PRESSURE: 82 MMHG | SYSTOLIC BLOOD PRESSURE: 163 MMHG | TEMPERATURE: 98.24 F | OXYGEN SATURATION: 94 %

## 2017-09-27 VITALS — OXYGEN SATURATION: 92 %

## 2017-09-27 LAB
ANION GAP SERPL CALC-SCNC: 9 MMOL/L (ref 3–11)
BUN SERPL-MCNC: 14 MG/DL (ref 7–18)
BUN/CREAT SERPL: 20.9 (ref 10–20)
CALCIUM SERPL-MCNC: 9.3 MG/DL (ref 8.5–10.1)
CHLORIDE SERPL-SCNC: 102 MMOL/L (ref 98–107)
CO2 SERPL-SCNC: 30 MMOL/L (ref 21–32)
CREAT CL PREDICTED SERPL C-G-VRATE: 94 ML/MIN
CREAT SERPL-MCNC: 0.69 MG/DL (ref 0.6–1.2)
GLUCOSE SERPL-MCNC: 88 MG/DL (ref 70–99)
POTASSIUM SERPL-SCNC: 3.4 MMOL/L (ref 3.5–5.1)
SODIUM SERPL-SCNC: 141 MMOL/L (ref 136–145)

## 2017-09-27 RX ADMIN — TOLTERODINE TARTRATE SCH MG: 2 CAPSULE, EXTENDED RELEASE ORAL at 08:49

## 2017-09-27 RX ADMIN — SPIRONOLACTONE SCH MG: 25 TABLET, FILM COATED ORAL at 08:49

## 2017-09-27 RX ADMIN — ALLOPURINOL SCH MG: 300 TABLET ORAL at 08:50

## 2017-09-27 RX ADMIN — POTASSIUM CHLORIDE SCH MEQ: 1500 TABLET, EXTENDED RELEASE ORAL at 08:50

## 2017-09-27 RX ADMIN — CLINDAMYCIN HYDROCHLORIDE SCH MG: 150 CAPSULE ORAL at 11:54

## 2017-09-27 RX ADMIN — ALBUTEROL SULFATE SCH PUFFS: 90 AEROSOL, METERED RESPIRATORY (INHALATION) at 08:53

## 2017-09-27 RX ADMIN — NICOTINE SCH PATCH: 21 PATCH, EXTENDED RELEASE TRANSDERMAL at 08:51

## 2017-09-27 RX ADMIN — HEPARIN SODIUM SCH UNIT: 10000 INJECTION, SOLUTION INTRAVENOUS; SUBCUTANEOUS at 14:53

## 2017-09-27 RX ADMIN — FUROSEMIDE SCH MG: 40 TABLET ORAL at 08:50

## 2017-09-27 RX ADMIN — CLOPIDOGREL BISULFATE SCH MG: 75 TABLET, FILM COATED ORAL at 08:49

## 2017-09-27 RX ADMIN — CLINDAMYCIN HYDROCHLORIDE SCH MG: 150 CAPSULE ORAL at 00:00

## 2017-09-27 RX ADMIN — CLINDAMYCIN HYDROCHLORIDE SCH MG: 150 CAPSULE ORAL at 05:36

## 2017-09-27 RX ADMIN — VERAPAMIL HYDROCHLORIDE SCH MG: 240 TABLET, FILM COATED, EXTENDED RELEASE ORAL at 08:49

## 2017-09-27 RX ADMIN — HEPARIN SODIUM SCH UNIT: 10000 INJECTION, SOLUTION INTRAVENOUS; SUBCUTANEOUS at 05:41

## 2017-09-27 NOTE — DISCHARGE INSTRUCTIONS
Discharge Instructions


Date of Service


Sep 27, 2017.





Admission


Reason for Admission:  Stroke





Discharge


Discharge Diagnosis / Problem:  CVA vs. Cervical Stenosis





Discharge Goals


Goal(s):  Improve function, Prevent Disease Progression





Activity Recommendations


Activity Limitations:  per Instructions/Follow-up section





.





Instructions / Follow-Up


Instructions / Follow-Up





Risk Factors for Stroke:





You can reduce your chances of stroke by working with your medical provider to 

adopt a healthy lifestyle.  Some specific ways to lower your chance of stroke 

are:





* If you are a smoker, now is the time to stop smoking cigarettes


* If you are diabetic, improve the control of your blood sugars


* Avoid excessive amounts of alcohol


* Control high blood pressure


* Lose weight if you are overweight


* Be sure to lead an active lifestyle


* Eat a healthy diet low in salt, cholesterol and fat





You should know about other risk factors for stroke that you are unable to 

control.  These include:





* Age 55 years or older


* Male gender


* Certain racial groups: ,  or /


* Family History of Stroke, Mini stroke or Heart Attack


* Sickle Cell Disease





Follow Up:





It is important for you to keep your follow up appointments with your medical 

provider.


Will need Follow up Chest xray in 2-3 days


Will need follow up K+ check in 2 days


Needs 2 more days of clindamycin treatment, as prescribed





Current Hospital Diet


Patient's current hospital diet: AHA Diet (Heart Healthy)





Discharge Diet


Recommended Diet:  AHA Diet (Heart Healthy)





Pending Studies


Studies pending at discharge:  no





Laboratory Results





Hemoglobin A1c








Test


  9/24/17


05:16 Range/Units


 


 


Estimated Average Glucose 111   mg/dl


 


Hemoglobin A1c 5.5  4.5-5.6  %








Lipid Panel








Test


  9/23/17


05:14 Range/Units


 


 


Triglycerides Level 206 H 0-150  mg/dl


 


Cholesterol Level 228 H 0-200  mg/dl


 


HDL Cholesterol 47   mg/dl


 


Cholesterol/HDL Ratio 4.9   


 


LDL Cholesterol, Calculated 140   mg/dl











Medical Emergencies








.


Who to Call and When:





Medical Emergencies:  Call 911 immediately if you experience any of the 

following warning signs and symptoms of Stroke:





* Sudden numbness or weakness of the face, arm or leg, especially on one side 

of the body


* Sudden confusion, trouble speaking or understanding


* Sudden trouble seeing in one or both eyes


* Sudden trouble walking, dizziness, loss of balance or coordination


* Sudden severe headache with no cause





Do not delay calling 911 if you experience any warning signs or symptoms of a 

stroke.





Delay in seeking medical attention may affect what treatments can be given to 

you.








.





Non-Emergent Contact


Non-Emergency issues call your:  Primary Care Provider





.


.








"Provider Documentation" section prepared by Maribeth Correia.








.





Stroke Core Measures


Reason no t-PA for Stroke:  Treatment not indicated (due to symptom duration)


Reason no antithrom by day 2:  Treatment provided - N/A


Reason no antithrom at D/C:  Treatment provided - N/A


Reason no statin at D/C:  Refusal of tx by patient


Reason no anticoag w/a fib:  Treatment not indicated





VTE Core Measure


Inpt VTE Proph given/why not?:  Unfractionated heparin SQ, SCD's

## 2017-09-27 NOTE — DISCHARGE SUMMARY
Discharge Summary


Date of Service


Sep 27, 2017.





Discharge Summary


Admission Date:


Sep 22, 2017 at 20:22


Discharge Date:  Sep 27, 2017


Discharge Disposition:  Skilled nursing facility


Principal Diagnosis:  Stroke


Problems/Secondary Diagnoses:


(1) HTN (hypertension)


Status: Chronic 


Pneumonia, hypokalemia, COPD, Urinary incontinence 





Immunizations:  


   Have You Had Influenza Vaccine:  Unknown


   History of Tetanus Vaccine?:  Unknown


   History of Pneumococcal:  Unknown


   History of Hepatitis B Vaccine:  Unknown


Consultations:


Neurology





Medication Reconciliation


New Medications:  


Clindamycin HCl (Clindamycin HCl) 150 Mg Cap


300 MG PO Q6 for 2 Days, #16 CAP





Clopidogrel Bisulfate (Clopidogrel) 75 Mg Tab


75 MG PO QAM for 30 Days, #30 TAB





Ezetimibe (Zetia) 10 Mg Tab


10 MG PO QAM for 30 Days, #30 TAB





Nitroglycerin (Nitrostat) 0.4 Mg/1 Tab Subl


0.4 MG SL UD PRN for Chest Pain for 30 Days, #30





 


Continued Medications:  


Acetaminophen (Tylenol) 500 Mg Tab


2 TAB PO QID





Albuterol Hfa (Ventolin Hfa) 200 Puffs/42062 Mcg Aers


2 PUFFS INH DAILY





Allopurinol (Allopurinol) 300 Mg Tab


300 MG PO QAM





Aspirin (Aspir-81) 81 Mg Tab


81 MG PO HS





Diphenhydramine Hcl (Allergy) 25 Mg Cap


1 CAP PO BID





Furosemide (Lasix) 40 Mg Tab


40 MG PO BID, TAB





Multivitamin (Multivitamin)  Tab


1 TAB PO QAM, TAB





Nicotine (Nicoderm Cq 21MG Patch) 21 Mg/24 Hr Dis


1 PATCH TD DAILY, PATCH





Potassium Chloride (Potassium Chloride Er) 10 Meq Cap


2 TAB PO BID





Pseudoephedrine (Sudafed) 30 Mg Tab


30 MG PO QID, TAB





Silver Sulfadiazine (Silvadene) 1 % Cre


1 APPLN TOP DAILY PRN for PRN





Spironolactone (Spironolactone) 25 Mg Tab


1 TAB PO DAILY





Tolterodine Tartrate (Detrol LA) 2 Mg Capcr


Unknown Dose PO DAILY





Verapamil Hcl (Calan Sr Ext Rel) 240 Mg Tabcr


240 MG PO BID, TAB











Discharge Exam


Review of Systems:  


   Constitutional:  + weakness, No fever, No chills, No sweats, No weight loss


   Eyes:  No worsening of vision, No diplopia


   ENT:  No hearing loss, No nasal symptoms, No sore throat, No trouble 

swallowing


   Respiratory:  + cough, + sputum, + dyspnea on exertion, No wheezing


   Cardiovascular:  + edema, No chest pain


   Abdomen:  No pain, No nausea, No vomiting, No diarrhea, No constipation


   Genitourinary - Female:  + urinary incontinence, No dysuria


   Neurologic:  + weakness (but improving on right arms/legs), + numbness/

tingling (neuropathy in feet)


   Integumentary:  No rash


Physical Exam:  


   General Appearance:  WD/WN, no apparent distress, + obese


   Eyes:  normal inspection, PERRL, EOMI


   ENT:  normal ENT inspection, hearing grossly normal, pharynx normal


   Neck:  supple, no JVD, trachea midline


   Respiratory/Chest:  chest non-tender, lungs clear, normal breath sounds, no 

respiratory distress, no accessory muscle use


   Cardiovascular:  regular rate, rhythm, no JVD, no murmur, normal peripheral 

pulses


   Abdomen / GI:  normal bowel sounds, non tender, soft, no organomegaly


   Extremities:  no calf tenderness, normal range of motion, + pedal edema


   Neurologic/Psychiatric:  CNs II-XII nml as tested, no motor/sensory deficits 

(Strength improving daily, 5-/5 on right side compared to left), alert, normal 

mood/affect, normal reflexes, oriented x 3


   Skin:  normal color, warm/dry





Hospital Course


66 year old female with a history of COPD who presented to the ED with 

complaints of persistent stroke symptoms that started 3 days prior. She says 

that she had laser surgery on her left eye 3 days prior, and after the procedure

, her right arm and right leg went numb. The patient states that she thought it 

was a pinched nerve, but since then, she had weakness in her right arm and 

right leg. She says that nothing is numb currently, but the weakness has 

persisted. The patient adds that she was completely fine 3 mornings prior to 

the procedure. The patient denied any headaches, nausea, vomiting, or worsened 

shortness of breath. She noted no history of strokes. She smokes a half a pack 

of cigarettes per day





Stroke symptoms: 


- Left hemispheric lesion/cervical etiology. Not a tPA candidate due to sx 

duration.  


- Pt declined MRA head and neck, and MRI brain, stating she cannot remain lying 

down for that long.  CT showed old lacunar infarct in right basal ganglia.  CTA 

head and neck showed no significant arterial stenosis and mild focal stenosis 

within the intracranial portion of the distal right vertebral artery. 


- Started on plavix, stopped home ASA 81.  


- Pt has allergy to statins but cannot recall what it was, just remembers she 

"got very sick"  


- Per Neuro:  General stroke risk factor modification.  


- Started on Zetia 10mg daily. 


- Neuro: F/u in their clinic in one month.  


- For rehab at [facility]; weakness is improving; she is able to eat and 

drink with right hand,  objects, but still diminished slightly compared 

to left.





Pneumonia: Denies known recent illness, fevers, cough, N/V.  Afebrile, clear 

lungs, no leukocytosis 


- CXR noted pulmonary vascular congestion and mild superimposed infiltrate 

right base, question if source is aspiration. 


- Day 5 (of 7) of Clindamycin 300 qid PO to be completed while at [facility]


- Repeat CXR in 2-3 days at [facility]





Hypokalemia:   


- 3.4 Monday; replenished with 40 mEq KCl, remains at 3.4 on discharge; patient 

had refused spironolactone yesterday due to what she states as "excessive loose 

stool" while on med.


Recheck BMP in 2 days.





COPD: Reported hx smoking x 50 years, perhaps 1/2 to 1 ppd.  


- On home albuterol.  


- Nicotine 21mg TD patch given while inpatient.


- Patient reports doing well off cigarettes, recognizes she needs to get away 

from addiction





HTN:  


- On home Verapamil  mg BID, Lasix 40 mg BID, and Spironolactone 25mg 

daily. She notes that she had been taking Spirolactone for a couple years, but 

was taken off of it 2 and a half years ago, but was put back on the 

Spirolactone a week and a half ago by Dr. Euceda


- Elevated BP on admit, given metoprolol 5 mg IV.  


- Per neuro: goal for inpatient of 150-175 systolic





Urinary incontinence: Continue home tolterodine as inpt.  





Prediabetes: Per report.  Glc so far were 94 & 81.  


-HbA1c 5.5%, compared to 5.4 % in july 2017 and one level of 7.8 as outpatient, 

date unknown.


-patient reports she had tried metformin but did not like GI side effects; 

would like to try management with diet alone


-Continue diet and recommend follow up as outpatient with PCP


Total Time Spent:  Less than 30 minutes


This includes examination of the patient, discharge planning, medication 

reconciliation, and communication with other providers.





Discharge Instructions


Please refer to the electronic Patient Visit Report (Discharge Instructions) 

for additional information.





Follow-Up


Follow up with neurology in clinic in 1 month.





Additional Copies To


Florentino Askew M.D.





Resident Tracking


Resident Involvement:  Resident Care Provided


Care Provided:  [facility]

## 2017-10-10 ENCOUNTER — HOSPITAL ENCOUNTER (OUTPATIENT)
Dept: HOSPITAL 45 - C.LAB1850 | Age: 67
Discharge: HOME | End: 2017-10-10
Attending: INTERNAL MEDICINE
Payer: COMMERCIAL

## 2017-10-10 DIAGNOSIS — E87.6: Primary | ICD-10-CM

## 2017-10-10 LAB
ANION GAP SERPL CALC-SCNC: 9 MMOL/L (ref 3–11)
APPEARANCE UR: CLEAR
BILIRUB UR-MCNC: (no result) MG/DL
BUN SERPL-MCNC: 14 MG/DL (ref 7–18)
BUN/CREAT SERPL: 17.8 (ref 10–20)
CALCIUM SERPL-MCNC: 9.7 MG/DL (ref 8.5–10.1)
CHLORIDE SERPL-SCNC: 103 MMOL/L (ref 98–107)
CO2 SERPL-SCNC: 29 MMOL/L (ref 21–32)
COLOR UR: YELLOW
CREAT SERPL-MCNC: 0.79 MG/DL (ref 0.6–1.2)
CREAT UR-MCNC: < 13 MG/DL
GLUCOSE SERPL-MCNC: 86 MG/DL (ref 70–99)
MAGNESIUM SERPL-MCNC: 2.2 MG/DL (ref 1.8–2.4)
MANUAL MICROSCOPIC REQUIRED?: NO
NITRITE UR QL STRIP: (no result)
PH UR STRIP: 7.5 [PH] (ref 4.5–7.5)
PHOSPHATE SERPL-MCNC: 3.6 MG/DL (ref 2.5–4.9)
POTASSIUM SERPL-SCNC: 3.7 MMOL/L (ref 3.5–5.1)
PROT UR STRIP-MCNC: < 5 MG/DL (ref 0–11.9)
REVIEW REQ?: NO
SODIUM SERPL-SCNC: 141 MMOL/L (ref 136–145)
SP GR UR STRIP: 1.01 (ref 1–1.03)
URINE EPITHELIAL CELL AUTO: (no result) /LPF (ref 0–5)
URINE PROTIEN/CREAT RATIO: (no result) (ref 0–0.2)
UROBILINOGEN UR-MCNC: (no result) MG/DL
ZZUR CULT IF INDIC CLEAN CATCH: NO

## 2018-05-01 ENCOUNTER — HOSPITAL ENCOUNTER (OUTPATIENT)
Dept: HOSPITAL 45 - C.LABPVFM | Age: 68
Discharge: HOME | End: 2018-05-01
Attending: NURSE PRACTITIONER
Payer: COMMERCIAL

## 2018-05-01 DIAGNOSIS — E11.9: ICD-10-CM

## 2018-05-01 DIAGNOSIS — E55.9: ICD-10-CM

## 2018-05-01 DIAGNOSIS — I10: Primary | ICD-10-CM

## 2018-05-01 DIAGNOSIS — E78.1: ICD-10-CM

## 2018-05-01 LAB
ALBUMIN SERPL-MCNC: 3.8 GM/DL (ref 3.4–5)
BUN SERPL-MCNC: 13 MG/DL (ref 7–18)
CALCIUM SERPL-MCNC: 9.1 MG/DL (ref 8.5–10.1)
CO2 SERPL-SCNC: 28 MMOL/L (ref 21–32)
CREAT SERPL-MCNC: 0.81 MG/DL (ref 0.6–1.2)
GLUCOSE SERPL-MCNC: 85 MG/DL (ref 70–99)
KETONES UR QL STRIP: 110 MG/DL
PH UR: 202 MG/DL (ref 0–200)
PHOSPHATE SERPL-MCNC: 3.4 MG/DL (ref 2.5–4.9)
POTASSIUM SERPL-SCNC: 3.9 MMOL/L (ref 3.5–5.1)
SODIUM SERPL-SCNC: 138 MMOL/L (ref 136–145)

## 2018-05-02 LAB — HBA1C MFR BLD: 5.3 % (ref 4.5–5.6)
